# Patient Record
Sex: FEMALE | Race: WHITE | URBAN - METROPOLITAN AREA
[De-identification: names, ages, dates, MRNs, and addresses within clinical notes are randomized per-mention and may not be internally consistent; named-entity substitution may affect disease eponyms.]

---

## 2020-06-09 ENCOUNTER — VIRTUAL VISIT (OUTPATIENT)
Dept: FAMILY MEDICINE CLINIC | Age: 30
End: 2020-06-09
Payer: COMMERCIAL

## 2020-06-09 PROBLEM — G56.03 BILATERAL CARPAL TUNNEL SYNDROME: Status: ACTIVE | Noted: 2020-06-09

## 2020-06-09 PROBLEM — F32.5 MAJOR DEPRESSIVE DISORDER WITH SINGLE EPISODE, IN FULL REMISSION (HCC): Status: ACTIVE | Noted: 2020-06-09

## 2020-06-09 PROCEDURE — 99203 OFFICE O/P NEW LOW 30 MIN: CPT | Performed by: INTERNAL MEDICINE

## 2020-06-09 RX ORDER — DULOXETIN HYDROCHLORIDE 30 MG/1
30 CAPSULE, DELAYED RELEASE ORAL DAILY
COMMUNITY
Start: 2020-03-16 | End: 2020-06-09 | Stop reason: SDUPTHER

## 2020-06-09 RX ORDER — DULOXETIN HYDROCHLORIDE 30 MG/1
30 CAPSULE, DELAYED RELEASE ORAL DAILY
Qty: 30 CAPSULE | Refills: 5 | Status: SHIPPED | OUTPATIENT
Start: 2020-06-09 | End: 2020-09-22 | Stop reason: SDUPTHER

## 2020-06-09 RX ORDER — GABAPENTIN 300 MG/1
300 CAPSULE ORAL NIGHTLY
Qty: 30 CAPSULE | Refills: 3 | Status: SHIPPED | OUTPATIENT
Start: 2020-06-09 | End: 2020-07-02 | Stop reason: SDUPTHER

## 2020-06-09 RX ORDER — GABAPENTIN 100 MG/1
100 CAPSULE ORAL 3 TIMES DAILY
Qty: 90 CAPSULE | Refills: 3 | Status: SHIPPED | OUTPATIENT
Start: 2020-06-09 | End: 2020-09-22 | Stop reason: SDUPTHER

## 2020-06-09 RX ORDER — CYCLOBENZAPRINE HCL 10 MG
10 TABLET ORAL NIGHTLY PRN
COMMUNITY
Start: 2020-04-20 | End: 2020-06-09 | Stop reason: SDUPTHER

## 2020-06-09 RX ORDER — TRAZODONE HYDROCHLORIDE 100 MG/1
100 TABLET ORAL NIGHTLY
Qty: 30 TABLET | Refills: 3 | Status: SHIPPED | OUTPATIENT
Start: 2020-06-09 | End: 2020-09-22 | Stop reason: SDUPTHER

## 2020-06-09 RX ORDER — GABAPENTIN 300 MG/1
300 CAPSULE ORAL NIGHTLY
COMMUNITY
Start: 2020-03-31 | End: 2020-06-09 | Stop reason: SDUPTHER

## 2020-06-09 RX ORDER — CYCLOBENZAPRINE HCL 10 MG
10 TABLET ORAL NIGHTLY PRN
Qty: 30 TABLET | Refills: 3 | Status: SHIPPED | OUTPATIENT
Start: 2020-06-09 | End: 2020-11-17 | Stop reason: SDUPTHER

## 2020-06-09 RX ORDER — TRAZODONE HYDROCHLORIDE 100 MG/1
1 TABLET ORAL DAILY
COMMUNITY
Start: 2020-04-27 | End: 2020-06-09 | Stop reason: SDUPTHER

## 2020-06-09 RX ORDER — GABAPENTIN 100 MG/1
100 CAPSULE ORAL 3 TIMES DAILY
COMMUNITY
Start: 2020-03-18 | End: 2020-06-09 | Stop reason: SDUPTHER

## 2020-06-09 ASSESSMENT — ENCOUNTER SYMPTOMS
WHEEZING: 0
SORE THROAT: 0
DIARRHEA: 0
SHORTNESS OF BREATH: 0
VOMITING: 0
SINUS PRESSURE: 0
RHINORRHEA: 0
SINUS PAIN: 0
EYE REDNESS: 0
ABDOMINAL PAIN: 0
NAUSEA: 0
COUGH: 0
EYE DISCHARGE: 0

## 2020-06-09 NOTE — PROGRESS NOTES
advised to contact this office for worsening conditions or problems, and seek emergency medical treatment and/or call 911 if deemed necessary. Patient identification was verified at the start of the visit: Yes    Total time spent on this encounter: Not billed by time    Services were provided through a video synchronous discussion virtually to substitute for in-person clinic visit. Patient and provider were located at their individual homes. --Dea Payan MD on 6/9/2020 at 1:19 PM    An electronic signature was used to authenticate this note.

## 2020-07-02 ENCOUNTER — TELEPHONE (OUTPATIENT)
Dept: FAMILY MEDICINE CLINIC | Age: 30
End: 2020-07-02

## 2020-07-02 RX ORDER — GABAPENTIN 300 MG/1
300 CAPSULE ORAL NIGHTLY
Qty: 30 CAPSULE | Refills: 3 | Status: SHIPPED | OUTPATIENT
Start: 2020-07-02 | End: 2020-09-22 | Stop reason: SDUPTHER

## 2020-09-22 RX ORDER — DULOXETIN HYDROCHLORIDE 30 MG/1
30 CAPSULE, DELAYED RELEASE ORAL DAILY
Qty: 30 CAPSULE | Refills: 0 | Status: SHIPPED | OUTPATIENT
Start: 2020-09-22 | End: 2021-03-09 | Stop reason: SDUPTHER

## 2020-09-22 RX ORDER — GABAPENTIN 300 MG/1
300 CAPSULE ORAL NIGHTLY
Qty: 30 CAPSULE | Refills: 0 | Status: SHIPPED | OUTPATIENT
Start: 2020-09-22 | End: 2020-12-02

## 2020-09-22 RX ORDER — GABAPENTIN 100 MG/1
100 CAPSULE ORAL 3 TIMES DAILY
Qty: 90 CAPSULE | Refills: 0 | Status: SHIPPED | OUTPATIENT
Start: 2020-09-22 | End: 2020-12-02

## 2020-09-22 RX ORDER — TRAZODONE HYDROCHLORIDE 100 MG/1
100 TABLET ORAL NIGHTLY
Qty: 30 TABLET | Refills: 0 | Status: SHIPPED | OUTPATIENT
Start: 2020-09-22 | End: 2020-11-02

## 2020-09-22 NOTE — TELEPHONE ENCOUNTER
----- Message from Everardo  sent at 9/22/2020 12:22 PM EDT -----  Subject: Message to Provider    QUESTIONS  Information for Provider? Patient lost her job and had to move to [place]. Can she get a few months refill on her medication until she can get state   insurance in [place] to see a doctor? Please call patient to discuss. ---------------------------------------------------------------------------  --------------  Trung HAWTHORNE  What is the best way for the office to contact you? OK to leave message on   voicemail  Preferred Call Back Phone Number? [phone]  ---------------------------------------------------------------------------  --------------  SCRIPT ANSWERS  Relationship to Patient?  Self

## 2020-09-29 RX ORDER — TRAZODONE HYDROCHLORIDE 100 MG/1
TABLET ORAL
Qty: 30 TABLET | Refills: 0 | OUTPATIENT
Start: 2020-09-29

## 2020-11-02 RX ORDER — TRAZODONE HYDROCHLORIDE 100 MG/1
TABLET ORAL
Qty: 30 TABLET | Refills: 0 | Status: SHIPPED | OUTPATIENT
Start: 2020-11-02 | End: 2020-12-02

## 2020-11-17 RX ORDER — CYCLOBENZAPRINE HCL 10 MG
TABLET ORAL
Qty: 15 TABLET | Refills: 3 | OUTPATIENT
Start: 2020-11-17

## 2020-11-17 RX ORDER — CYCLOBENZAPRINE HCL 10 MG
10 TABLET ORAL NIGHTLY PRN
Qty: 30 TABLET | Refills: 0 | Status: SHIPPED | OUTPATIENT
Start: 2020-11-17 | End: 2020-12-23

## 2020-11-17 NOTE — TELEPHONE ENCOUNTER
Please call patient and let her know I sent her in a 30-day supply. Please document call and then close encounter.   thanks

## 2020-12-02 RX ORDER — GABAPENTIN 300 MG/1
CAPSULE ORAL
Qty: 90 CAPSULE | Refills: 0 | Status: SHIPPED | OUTPATIENT
Start: 2020-12-02 | End: 2021-03-09 | Stop reason: SDUPTHER

## 2020-12-02 RX ORDER — TRAZODONE HYDROCHLORIDE 100 MG/1
TABLET ORAL
Qty: 90 TABLET | Refills: 0 | Status: SHIPPED | OUTPATIENT
Start: 2020-12-02 | End: 2021-02-24

## 2020-12-02 RX ORDER — GABAPENTIN 100 MG/1
CAPSULE ORAL
Qty: 90 CAPSULE | Refills: 0 | Status: SHIPPED | OUTPATIENT
Start: 2020-12-02 | End: 2020-12-30

## 2020-12-30 RX ORDER — GABAPENTIN 100 MG/1
CAPSULE ORAL
Qty: 90 CAPSULE | Refills: 0 | Status: SHIPPED | OUTPATIENT
Start: 2020-12-30 | End: 2021-02-01

## 2020-12-30 NOTE — LETTER
[address], [address] [address]  Phone: [phone]  Fax: [phone]    Trung Lorenzo MD       December 31, 2020    [place] [id number]      Dear Maria Esther Almaraz:    Cyndi Hernandez are due for a follow up with Dr. Tenzin Grant in February 2021. Please call to schedule this at your soonest convenience for further fills on your medication. If you have any questions or concerns, please don't hesitate to call.     Sincerely,        Lila Dunn CMA

## 2021-02-01 RX ORDER — GABAPENTIN 100 MG/1
CAPSULE ORAL
Qty: 90 CAPSULE | Refills: 0 | Status: SHIPPED | OUTPATIENT
Start: 2021-02-01 | End: 2021-03-09 | Stop reason: SDUPTHER

## 2021-02-08 ENCOUNTER — TELEPHONE (OUTPATIENT)
Dept: FAMILY MEDICINE CLINIC | Age: 31
End: 2021-02-08

## 2021-02-08 NOTE — TELEPHONE ENCOUNTER
----- Message from [facility] [place] - INPATIENT sent at 2/8/2021 10:22 AM EST -----  Subject: Appointment Request    Reason for Call: Routine Existing Condition Follow Up    QUESTIONS  Type of Appointment? Established Patient  Reason for appointment request? No appointments available during search  Additional Information for Provider? Requesting a med check with PCP . Please follow up to advise   ---------------------------------------------------------------------------  --------------  CALL BACK INFO  What is the best way for the office to contact you? OK to leave message on   voicemail  Preferred Call Back Phone Number? [phone]  ---------------------------------------------------------------------------  --------------  SCRIPT ANSWERS  Relationship to Patient? Self  Appointment reason? Well Care/Follow Ups  Select a Well Care/Follow Ups appointment reason? Adult Existing Condition   Follow Up [Diabetes   CHF   COPD   Hypertension/Blood Pressure Check]  (Is the patient requesting to be seen urgently for their symptoms?)? No  Is this follow up request related to routine Diabetes Management? No  Are you having any new concerns about your existing condition? No  Have you been diagnosed with   tested for   or told that you are suspected of having COVID-19 (Coronavirus)? No  Have you had a fever or taken medication to treat a fever within the past   3 days? No  Have you had a cough   shortness of breath or flu-like symptoms within the past 3 days? No  Do you currently have flu-like symptoms including fever or chills   cough   shortness of breath   or difficulty breathing   or new loss of taste or smell? No  (Service Expert  click yes below to proceed with [employer] As Usual   Scheduling)?  Yes

## 2021-02-24 DIAGNOSIS — F51.01 PRIMARY INSOMNIA: ICD-10-CM

## 2021-02-24 RX ORDER — TRAZODONE HYDROCHLORIDE 100 MG/1
TABLET ORAL
Qty: 90 TABLET | Refills: 1 | Status: SHIPPED | OUTPATIENT
Start: 2021-02-24 | End: 2021-06-08

## 2021-03-09 ENCOUNTER — OFFICE VISIT (OUTPATIENT)
Dept: FAMILY MEDICINE CLINIC | Age: 31
End: 2021-03-09

## 2021-03-09 VITALS
WEIGHT: 116 LBS | RESPIRATION RATE: 16 BRPM | DIASTOLIC BLOOD PRESSURE: 88 MMHG | BODY MASS INDEX: 19.81 KG/M2 | HEART RATE: 71 BPM | SYSTOLIC BLOOD PRESSURE: 138 MMHG | OXYGEN SATURATION: 99 % | TEMPERATURE: 98.8 F | HEIGHT: 64 IN

## 2021-03-09 DIAGNOSIS — G56.03 BILATERAL CARPAL TUNNEL SYNDROME: ICD-10-CM

## 2021-03-09 DIAGNOSIS — F51.01 PRIMARY INSOMNIA: Primary | ICD-10-CM

## 2021-03-09 DIAGNOSIS — F32.5 MAJOR DEPRESSIVE DISORDER WITH SINGLE EPISODE, IN FULL REMISSION (HCC): ICD-10-CM

## 2021-03-09 DIAGNOSIS — M62.838 MUSCLE SPASM: ICD-10-CM

## 2021-03-09 PROCEDURE — 99214 OFFICE O/P EST MOD 30 MIN: CPT | Performed by: INTERNAL MEDICINE

## 2021-03-09 RX ORDER — DULOXETIN HYDROCHLORIDE 30 MG/1
30 CAPSULE, DELAYED RELEASE ORAL DAILY
Qty: 90 CAPSULE | Refills: 3 | Status: SHIPPED | OUTPATIENT
Start: 2021-03-09 | End: 2021-10-20 | Stop reason: SDUPTHER

## 2021-03-09 RX ORDER — GABAPENTIN 100 MG/1
100 CAPSULE ORAL 3 TIMES DAILY
Qty: 270 CAPSULE | Refills: 3 | Status: SHIPPED | OUTPATIENT
Start: 2021-03-09 | End: 2021-04-28

## 2021-03-09 RX ORDER — GABAPENTIN 300 MG/1
300 CAPSULE ORAL 2 TIMES DAILY
Qty: 180 CAPSULE | Refills: 3 | Status: SHIPPED | OUTPATIENT
Start: 2021-03-09 | End: 2021-04-28

## 2021-03-09 RX ORDER — CYCLOBENZAPRINE HCL 10 MG
10 TABLET ORAL NIGHTLY PRN
Qty: 90 TABLET | Refills: 0 | Status: SHIPPED | OUTPATIENT
Start: 2021-03-09 | End: 2021-06-08

## 2021-03-09 ASSESSMENT — ENCOUNTER SYMPTOMS: BACK PAIN: 1

## 2021-03-09 NOTE — PROGRESS NOTES
Brianda Vargas (:  1990) is a 33 y.o. female,Established patient, here for evaluation of the following chief complaint(s):  Medication Refill      ASSESSMENT/PLAN:  1. Primary insomnia  Doing very well on trazodone, continue. Advised her not to take in combination with the muscle relaxer however. She voices her agreement understanding  2. Muscle spasm  To use as needed. Heat application  -     cyclobenzaprine (FLEXERIL) 10 MG tablet; Take 1 tablet by mouth nightly as needed for Muscle spasms, Disp-90 tablet, R-0Normal  3. Bilateral carpal tunnel syndrome  She will see the hand specialist when she has insurance. Continue gabapentin for now. Tolerating well without any adverse effects. Refills provided today  4. Major depressive disorder with single episode, in full remission [facility])  She has done very well with Cymbalta. I found a coupon on good Rx so that this will be more affordable for her. She plans to continue it as it is much more affordable with the use of the coupon    Return in about 6 months (around 2021). SUBJECTIVE/OBJECTIVE:  HPI  Patient presents for routine follow-up. She has a history of carpal tunnel syndrome. She wants to have surgery done but she does not have insurance at present. She has been taking the gabapentin which is very effective for her. She typically takes 100 mg throughout the day to mitigate the sedation when she is trying to work. She will then take a higher dose at night 300 to 600 mg. This has been working well for her. No adverse effects. She does work as a  and gets muscle spasms in her back. She uses Flexeril as needed and request a refill today. No adverse effects of therapy. Her insomnia has been well controlled with trazodone. She is very happy with the medication and feels it is effective. Her mood is also been stable on Cymbalta, however, is expensive for her knowledge she does not have insurance.   She is wondering if there may be other options. She feels that it has been very effective for her mood however. Review of Systems   Musculoskeletal: Positive for back pain and myalgias. Negative for joint swelling, neck pain and neck stiffness. Neurological: Positive for numbness. Negative for weakness. Psychiatric/Behavioral: Negative for dysphoric mood, sleep disturbance and suicidal ideas. The patient is not nervous/anxious. Physical Exam  Constitutional:       Appearance: Normal appearance. HENT:      Head: Normocephalic and atraumatic. Cardiovascular:      Rate and Rhythm: Normal rate and regular rhythm. Heart sounds: No murmur. Pulmonary:      Effort: Pulmonary effort is normal. No respiratory distress. Breath sounds: Normal breath sounds. Neurological:      General: No focal deficit present. Psychiatric:         Mood and Affect: Mood normal.         Behavior: Behavior normal.         Thought Content: Thought content normal.         Judgment: Judgment normal.       Current Outpatient Medications   Medication Sig Dispense Refill    DULoxetine (CYMBALTA) 30 MG extended release capsule Take 1 capsule by mouth daily 90 capsule 3    gabapentin (NEURONTIN) 100 MG capsule Take 1 capsule by mouth 3 times daily for 90 days. 270 capsule 3    gabapentin (NEURONTIN) 300 MG capsule Take 1 capsule by mouth 2 times daily for 90 days. 180 capsule 3    cyclobenzaprine (FLEXERIL) 10 MG tablet Take 1 tablet by mouth nightly as needed for Muscle spasms 90 tablet 0    traZODone (DESYREL) 100 MG tablet TAKE 1 TABLET BY MOUTH EVERYDAY AT BEDTIME 90 tablet 1     No current facility-administered medications for this visit. An electronic signature was used to authenticate this note.     --Nichol Macias MD

## 2021-04-19 ENCOUNTER — TELEPHONE (OUTPATIENT)
Dept: FAMILY MEDICINE CLINIC | Age: 31
End: 2021-04-19

## 2021-04-19 NOTE — TELEPHONE ENCOUNTER
PT called in stating that she has carpal tunnel and that she works with [employer] and yesterday she felt like she had pins and needles in her hands. PT would like to know if Dr. Nichelle Rick can call in an antiinflammatory and possibly increase her gabapentin to help.      Please call Pt back: [phone]

## 2021-04-19 NOTE — TELEPHONE ENCOUNTER
She can take OTC ibuprofen. Would need appt to adjust medication. Also recommend she be wearing wrist braces.

## 2021-04-28 ENCOUNTER — OFFICE VISIT (OUTPATIENT)
Dept: FAMILY MEDICINE CLINIC | Age: 31
End: 2021-04-28

## 2021-04-28 VITALS
DIASTOLIC BLOOD PRESSURE: 88 MMHG | OXYGEN SATURATION: 100 % | HEART RATE: 83 BPM | BODY MASS INDEX: 20.94 KG/M2 | SYSTOLIC BLOOD PRESSURE: 144 MMHG | RESPIRATION RATE: 16 BRPM | WEIGHT: 122 LBS

## 2021-04-28 DIAGNOSIS — G56.03 BILATERAL CARPAL TUNNEL SYNDROME: Primary | ICD-10-CM

## 2021-04-28 PROCEDURE — 99214 OFFICE O/P EST MOD 30 MIN: CPT | Performed by: INTERNAL MEDICINE

## 2021-04-28 RX ORDER — GABAPENTIN 300 MG/1
CAPSULE ORAL
Qty: 180 CAPSULE | Refills: 3 | Status: SHIPPED | OUTPATIENT
Start: 2021-04-28 | End: 2021-04-28

## 2021-04-28 RX ORDER — GABAPENTIN 300 MG/1
CAPSULE ORAL
Qty: 180 CAPSULE | Refills: 3 | Status: SHIPPED | OUTPATIENT
Start: 2021-04-28 | End: 2021-10-20 | Stop reason: SDUPTHER

## 2021-04-28 NOTE — PROGRESS NOTES
Karina Posada (:  1990) is a 33 y.o. female,Established patient, here for evaluation of the following chief complaint(s):  Discuss Medications and Carpal Tunnel      ASSESSMENT/PLAN:  1. Bilateral carpal tunnel syndrome  Symptoms are progressing. She is awaiting surgery. We will increase her gabapentin. She can take 1 to 2 tablets 3 times daily as tolerated. Continue wrist splinting. See the surgeon as soon as her insurance is instated. She states that she will come back to see me as needed as once her insurance starts, she will be seen a primary care doctor in [place] most likely. She defers to call in the interim for any new issues. Return if symptoms worsen or fail to improve. SUBJECTIVE/OBJECTIVE:  HPI   Patient presents for f/u for CTS. Currently waiting for insurance to start at end of  to pursue surgery- has already seen a surgeon in [place] for this issue. Gabapentin helps. Does increase her dose when needed and is running out of pills. Basically adjusts her dose depending on her pain during the shift. There are days when she takes lower doses when the pain is better. EMG results per patient (with Dr. Sid Lemon) 1 year ago: Right severe CTS, left was mild on EMG. Feels it has worsened on the left as she is trying to commentate for the pain on the right. Wearing wrist splints as often/as much as she can. Difficult with her job sometimes but always at night. Review of Systems   Neurological: Positive for numbness. Negative for weakness. Physical Exam  Constitutional:       Appearance: Normal appearance. Neurological:      Mental Status: She is alert. bilateral wrist splints in place       An electronic signature was used to authenticate this note.     --Heena Hayes MD

## 2021-05-10 ENCOUNTER — TELEPHONE (OUTPATIENT)
Dept: FAMILY MEDICINE CLINIC | Age: 31
End: 2021-05-10

## 2021-05-10 DIAGNOSIS — G56.03 BILATERAL CARPAL TUNNEL SYNDROME: Primary | ICD-10-CM

## 2021-05-10 RX ORDER — NAPROXEN 250 MG/1
250 TABLET ORAL 2 TIMES DAILY PRN
Qty: 60 TABLET | Refills: 0 | Status: SHIPPED | OUTPATIENT
Start: 2021-05-10 | End: 2022-05-13 | Stop reason: ALTCHOICE

## 2021-05-10 NOTE — TELEPHONE ENCOUNTER
Patient calling regarding anti-inflammatory. She states her last office visit it was discuss,asking if something can be called into the pharmacy.   [address], [address] - P [phone]

## 2021-07-08 ENCOUNTER — TELEPHONE (OUTPATIENT)
Dept: FAMILY MEDICINE CLINIC | Age: 31
End: 2021-07-08

## 2021-07-08 NOTE — TELEPHONE ENCOUNTER
JANAEI-- calling to inform Dr Mandy Alva that she will not be transferring physicians to her company physician, that she is going to continue to be seen here.

## 2021-07-13 DIAGNOSIS — M62.838 MUSCLE SPASM: ICD-10-CM

## 2021-07-13 RX ORDER — CYCLOBENZAPRINE HCL 10 MG
10 TABLET ORAL NIGHTLY PRN
Qty: 90 TABLET | Refills: 0 | Status: SHIPPED | OUTPATIENT
Start: 2021-07-13 | End: 2021-10-11

## 2021-10-20 ENCOUNTER — OFFICE VISIT (OUTPATIENT)
Dept: FAMILY MEDICINE CLINIC | Age: 31
End: 2021-10-20
Payer: COMMERCIAL

## 2021-10-20 VITALS
SYSTOLIC BLOOD PRESSURE: 132 MMHG | BODY MASS INDEX: 19.97 KG/M2 | HEART RATE: 66 BPM | HEIGHT: 64 IN | DIASTOLIC BLOOD PRESSURE: 84 MMHG | WEIGHT: 117 LBS | OXYGEN SATURATION: 100 %

## 2021-10-20 DIAGNOSIS — G56.03 BILATERAL CARPAL TUNNEL SYNDROME: Primary | ICD-10-CM

## 2021-10-20 DIAGNOSIS — F32.5 MAJOR DEPRESSIVE DISORDER WITH SINGLE EPISODE, IN FULL REMISSION (HCC): ICD-10-CM

## 2021-10-20 DIAGNOSIS — F51.01 PRIMARY INSOMNIA: ICD-10-CM

## 2021-10-20 PROCEDURE — 99214 OFFICE O/P EST MOD 30 MIN: CPT | Performed by: INTERNAL MEDICINE

## 2021-10-20 RX ORDER — GABAPENTIN 300 MG/1
600 CAPSULE ORAL 3 TIMES DAILY
Qty: 180 CAPSULE | Refills: 3 | Status: SHIPPED | OUTPATIENT
Start: 2021-10-20 | End: 2022-08-30 | Stop reason: SDUPTHER

## 2021-10-20 RX ORDER — TRAZODONE HYDROCHLORIDE 100 MG/1
TABLET ORAL
Qty: 90 TABLET | Refills: 1 | Status: SHIPPED | OUTPATIENT
Start: 2021-10-20 | End: 2022-04-26 | Stop reason: SDUPTHER

## 2021-10-20 RX ORDER — DULOXETIN HYDROCHLORIDE 30 MG/1
30 CAPSULE, DELAYED RELEASE ORAL DAILY
Qty: 90 CAPSULE | Refills: 3 | Status: SHIPPED | OUTPATIENT
Start: 2021-10-20 | End: 2022-04-21 | Stop reason: SDUPTHER

## 2021-10-20 NOTE — PROGRESS NOTES
Boston Alvarado (:  1990) is a 33 y.o. female,Established patient, here for evaluation of the following chief complaint(s):  Carpal Tunnel      ASSESSMENT/PLAN:  1. Bilateral carpal tunnel syndrome  Continue gabapentin. Awaiting surgery   2. Primary insomnia  Working well, cpm  -     traZODone (DESYREL) 100 MG tablet; TAKE 1 TABLET BY MOUTH EVERYDAY AT BEDTIME, Disp-90 tablet, R-1Normal  3. Major depressive disorder with single episode, in full remission ([facility] [address]  Stable on present management, cpm.     Return in about 6 months (around 2022). SUBJECTIVE/OBJECTIVE:  HPI   Patient presents for f/u for CTS, insomnia and depression  New job. Needs to wait to have surgery. Got a new job. Working out time off. Brought prior EMG with her - severe CTS. Gabapentin helps    Trazodone working well for sleep. No a/e  Mood is stable on duloxetine     Review of Systems   Neurological: Positive for numbness. Negative for weakness. Psychiatric/Behavioral: Negative for dysphoric mood and sleep disturbance. The patient is not nervous/anxious. /84   Pulse 66   Ht 5' 4\" (1.626 m)   Wt 117 lb (53.1 kg)   SpO2 100%   BMI 20.08 kg/m²     Physical Exam  Constitutional:       Appearance: Normal appearance. Neurological:      Mental Status: She is alert. Psychiatric:         Mood and Affect: Mood normal.         Behavior: Behavior normal.         Thought Content: Thought content normal.         Judgment: Judgment normal.          An electronic signature was used to authenticate this note.     --Roby Zamora MD

## 2021-12-28 ENCOUNTER — TELEPHONE (OUTPATIENT)
Dept: FAMILY MEDICINE CLINIC | Age: 31
End: 2021-12-28

## 2021-12-28 NOTE — TELEPHONE ENCOUNTER
Called and spoke with patient, advised that Dr Jasiel Perez is not in the office anymore. Patient aware but hasn't decided if she wanted to stay within the practice. Patient will return call if she decides to stay.

## 2022-04-21 RX ORDER — DULOXETIN HYDROCHLORIDE 30 MG/1
30 CAPSULE, DELAYED RELEASE ORAL DAILY
Qty: 90 CAPSULE | Refills: 0 | Status: SHIPPED | OUTPATIENT
Start: 2022-04-21 | End: 2022-07-18

## 2022-04-21 NOTE — TELEPHONE ENCOUNTER
Patient is calling requesting refills for:    Medication:   Requested Prescriptions    Pending Prescriptions Disp Refills   DULoxetine (CYMBALTA) 30 MG extended release capsule 90 capsule 3    Sig: Take 1 capsule by mouth daily      Last Filled:      Patient Phone Number: [phone] (home)     Last appt: 10/20/2021   Next appt: 5/13/2022    Pharmacy:  [email or web address]  [id number] - [address] [phone] [facility] [phone]  [address] [place] [address]  Phone: [phone] Fax: [phone]

## 2022-04-25 DIAGNOSIS — F51.01 PRIMARY INSOMNIA: ICD-10-CM

## 2022-04-25 NOTE — TELEPHONE ENCOUNTER
Medication:   Requested Prescriptions     Pending Prescriptions Disp Refills    traZODone (DESYREL) 100 MG tablet 90 tablet 1     Sig: TAKE 1 TABLET BY MOUTH EVERYDAY AT BEDTIME       Last Filled:      Patient Phone Number: [phone] (home)     Last appt: 10/20/2021   Next appt: 5/13/2022

## 2022-04-26 RX ORDER — TRAZODONE HYDROCHLORIDE 100 MG/1
TABLET ORAL
Qty: 90 TABLET | Refills: 0 | Status: SHIPPED | OUTPATIENT
Start: 2022-04-26 | End: 2022-05-13

## 2022-05-12 DIAGNOSIS — F51.01 PRIMARY INSOMNIA: ICD-10-CM

## 2022-05-13 ENCOUNTER — OFFICE VISIT (OUTPATIENT)
Dept: FAMILY MEDICINE CLINIC | Age: 32
End: 2022-05-13

## 2022-05-13 VITALS
HEART RATE: 90 BPM | DIASTOLIC BLOOD PRESSURE: 80 MMHG | RESPIRATION RATE: 16 BRPM | SYSTOLIC BLOOD PRESSURE: 160 MMHG | OXYGEN SATURATION: 100 % | HEIGHT: 64 IN | BODY MASS INDEX: 20.83 KG/M2 | WEIGHT: 122 LBS

## 2022-05-13 DIAGNOSIS — G56.03 BILATERAL CARPAL TUNNEL SYNDROME: Primary | ICD-10-CM

## 2022-05-13 DIAGNOSIS — F33.41 RECURRENT MAJOR DEPRESSIVE DISORDER, IN PARTIAL REMISSION (HCC): ICD-10-CM

## 2022-05-13 DIAGNOSIS — I10 PRIMARY HYPERTENSION: ICD-10-CM

## 2022-05-13 DIAGNOSIS — Z86.39 HISTORY OF THYROID DISEASE: ICD-10-CM

## 2022-05-13 LAB
ANION GAP SERPL CALCULATED.3IONS-SCNC: 12 MMOL/L (ref 3–16)
BASOPHILS ABSOLUTE: 0.1 K/UL (ref 0–0.2)
BASOPHILS RELATIVE PERCENT: 1.1 %
BUN BLDV-MCNC: 9 MG/DL (ref 7–20)
CALCIUM SERPL-MCNC: 9.4 MG/DL (ref 8.3–10.6)
CHLORIDE BLD-SCNC: 104 MMOL/L (ref 99–110)
CO2: 24 MMOL/L (ref 21–32)
CREAT SERPL-MCNC: 0.7 MG/DL (ref 0.6–1.1)
EOSINOPHILS ABSOLUTE: 0.1 K/UL (ref 0–0.6)
EOSINOPHILS RELATIVE PERCENT: 1.5 %
GFR AFRICAN AMERICAN: >60
GFR NON-AFRICAN AMERICAN: >60
GLUCOSE BLD-MCNC: 92 MG/DL (ref 70–99)
HCT VFR BLD CALC: 41.9 % (ref 36–48)
HEMOGLOBIN: 14 G/DL (ref 12–16)
LYMPHOCYTES ABSOLUTE: 1.5 K/UL (ref 1–5.1)
LYMPHOCYTES RELATIVE PERCENT: 24.8 %
MCH RBC QN AUTO: 30.3 PG (ref 26–34)
MCHC RBC AUTO-ENTMCNC: 33.4 G/DL (ref 31–36)
MCV RBC AUTO: 90.9 FL (ref 80–100)
MONOCYTES ABSOLUTE: 0.4 K/UL (ref 0–1.3)
MONOCYTES RELATIVE PERCENT: 6 %
NEUTROPHILS ABSOLUTE: 4 K/UL (ref 1.7–7.7)
NEUTROPHILS RELATIVE PERCENT: 66.6 %
PDW BLD-RTO: 13.6 % (ref 12.4–15.4)
PLATELET # BLD: 208 K/UL (ref 135–450)
PMV BLD AUTO: 9.1 FL (ref 5–10.5)
POTASSIUM SERPL-SCNC: 4.6 MMOL/L (ref 3.5–5.1)
RBC # BLD: 4.61 M/UL (ref 4–5.2)
SODIUM BLD-SCNC: 140 MMOL/L (ref 136–145)
TSH REFLEX: 0.87 UIU/ML (ref 0.27–4.2)
WBC # BLD: 6 K/UL (ref 4–11)

## 2022-05-13 PROCEDURE — 36415 COLL VENOUS BLD VENIPUNCTURE: CPT | Performed by: FAMILY MEDICINE

## 2022-05-13 PROCEDURE — 99214 OFFICE O/P EST MOD 30 MIN: CPT | Performed by: FAMILY MEDICINE

## 2022-05-13 RX ORDER — MIRTAZAPINE 15 MG/1
15 TABLET, FILM COATED ORAL NIGHTLY
Qty: 30 TABLET | Refills: 1 | Status: SHIPPED | OUTPATIENT
Start: 2022-05-13 | End: 2022-06-06

## 2022-05-13 RX ORDER — CYCLOBENZAPRINE HCL 10 MG
10 TABLET ORAL 2 TIMES DAILY PRN
Qty: 180 TABLET | Refills: 0 | Status: SHIPPED | OUTPATIENT
Start: 2022-05-13 | End: 2022-05-13 | Stop reason: SDUPTHER

## 2022-05-13 RX ORDER — TRAZODONE HYDROCHLORIDE 100 MG/1
TABLET ORAL
Qty: 90 TABLET | Refills: 0 | OUTPATIENT
Start: 2022-05-13

## 2022-05-13 RX ORDER — CYCLOBENZAPRINE HCL 10 MG
10 TABLET ORAL DAILY
COMMUNITY
End: 2022-05-13 | Stop reason: SDUPTHER

## 2022-05-13 RX ORDER — CYCLOBENZAPRINE HCL 10 MG
10 TABLET ORAL 2 TIMES DAILY PRN
Qty: 180 TABLET | Refills: 0 | Status: SHIPPED | OUTPATIENT
Start: 2022-05-13 | End: 2022-08-09

## 2022-05-13 SDOH — ECONOMIC STABILITY: FOOD INSECURITY: WITHIN THE PAST 12 MONTHS, YOU WORRIED THAT YOUR FOOD WOULD RUN OUT BEFORE YOU GOT MONEY TO BUY MORE.: NEVER TRUE

## 2022-05-13 SDOH — ECONOMIC STABILITY: FOOD INSECURITY: WITHIN THE PAST 12 MONTHS, THE FOOD YOU BOUGHT JUST DIDN'T LAST AND YOU DIDN'T HAVE MONEY TO GET MORE.: NEVER TRUE

## 2022-05-13 ASSESSMENT — PATIENT HEALTH QUESTIONNAIRE - PHQ9
SUM OF ALL RESPONSES TO PHQ QUESTIONS 1-9: 2
1. LITTLE INTEREST OR PLEASURE IN DOING THINGS: 1
9. THOUGHTS THAT YOU WOULD BE BETTER OFF DEAD, OR OF HURTING YOURSELF: 0
5. POOR APPETITE OR OVEREATING: 0
8. MOVING OR SPEAKING SO SLOWLY THAT OTHER PEOPLE COULD HAVE NOTICED. OR THE OPPOSITE, BEING SO FIGETY OR RESTLESS THAT YOU HAVE BEEN MOVING AROUND A LOT MORE THAN USUAL: 0
SUM OF ALL RESPONSES TO PHQ QUESTIONS 1-9: 2
10. IF YOU CHECKED OFF ANY PROBLEMS, HOW DIFFICULT HAVE THESE PROBLEMS MADE IT FOR YOU TO DO YOUR WORK, TAKE CARE OF THINGS AT HOME, OR GET ALONG WITH OTHER PEOPLE: 0
4. FEELING TIRED OR HAVING LITTLE ENERGY: 0
2. FEELING DOWN, DEPRESSED OR HOPELESS: 1
7. TROUBLE CONCENTRATING ON THINGS, SUCH AS READING THE NEWSPAPER OR WATCHING TELEVISION: 0
3. TROUBLE FALLING OR STAYING ASLEEP: 0
SUM OF ALL RESPONSES TO PHQ QUESTIONS 1-9: 2
6. FEELING BAD ABOUT YOURSELF - OR THAT YOU ARE A FAILURE OR HAVE LET YOURSELF OR YOUR FAMILY DOWN: 0
SUM OF ALL RESPONSES TO PHQ QUESTIONS 1-9: 2
SUM OF ALL RESPONSES TO PHQ9 QUESTIONS 1 & 2: 2

## 2022-05-13 ASSESSMENT — SOCIAL DETERMINANTS OF HEALTH (SDOH): HOW HARD IS IT FOR YOU TO PAY FOR THE VERY BASICS LIKE FOOD, HOUSING, MEDICAL CARE, AND HEATING?: NOT HARD AT ALL

## 2022-05-13 NOTE — PROGRESS NOTES
A/P:    Diagnosis Orders   1. Bilateral carpal tunnel syndrome  Jareth Doan MD, Orthopedic Surgery, [facility]   2. Primary hypertension  Basic Metabolic Panel    TSH with Reflex    CBC with Auto Differential   3. Recurrent major depressive disorder, in partial remission ([facility] [address]     4. History of thyroid disease  TSH with Reflex     She agrees to get another opinion from orthopedic surgery on her carpal tunnel syndrome, she will continue gabapentin, duloxetine, she would like to stay at the current doses    She does qualify for the diagnosis of hypertension, she will get a BP meter and check her blood pressure daily for the next 7 to 10 days and then let me know those readings, will check above lab work, she reports having both hypo and hyperthyroidism as a teenager    For her decreased appetite, insomnia, known depression, will stop trazodone and add on mirtazapine 15 mg at night, she agrees to get medical attention of SI or HI should develop, she is not interested in any counseling, psychiatry referral    Follow-up in 6 weeks for Pap smear, med check, or sooner if needed    O:   BP (!) 160/80   Pulse 90   Resp 16   Ht 5' 4\" (1.626 m)   Wt 122 lb (55.3 kg)   LMP 04/28/2022   SpO2 100%   BMI 20.94 kg/m² Gen- NAD, pleasant  HEENT- Eyes without icterus or injection, throat and tms unremarkable  Neck- Supple, no lymphadenopathy appreciated  Lungs- CTAB  Heart- RRR  Abd- Soft, non tender  Ext- No edema  Psych- Appropriate    S: CC-establish care  HPI-patient presents establish care with new clinic provider. She reports chronic bilateral carpal tunnel syndrome, severe on right, mild on left for years. She has pain and weakness. She reports that she saw 2 orthopedic surgeons in the past who told her surgery was not an option. She reports her depression is reasonably controlled. However, she rates it a 5 out of 10 with 10 being the worst.  She has not been eating well for a few months.   She attributes this to stress. She denies SI, HI. She takes about a half a trazodone per night for insomnia. ROS  Denies fever, chills, night sweats  Denies headaches, sore throat, ear pain  Denies chest pain, dyspnea, palpitations  Denies nausea, vomiting, diarrhea  Denies rashes    Current Outpatient Medications   Medication Sig Dispense Refill    cyclobenzaprine (FLEXERIL) 10 MG tablet Take 1 tablet by mouth 2 times daily as needed for Muscle spasms 180 tablet 0    mirtazapine (REMERON) 15 MG tablet Take 1 tablet by mouth nightly 30 tablet 1    DULoxetine (CYMBALTA) 30 MG extended release capsule Take 1 capsule by mouth daily 90 capsule 0    gabapentin (NEURONTIN) 300 MG capsule Take 2 capsules by mouth 3 times daily for 30 days. Take 1-2 capsules three times daily as needed for pain and numbness 180 capsule 3     No current facility-administered medications for this visit. No Known Allergies     Past Medical History:   Diagnosis Date    Carpal tunnel syndrome     severe on right, mild left    Chronic pain     Depression     Insomnia     Thyroid disease     Both hypothyroidism and hyperthyroidism as a teenager, was on medications temporarily, reports level normalized        History reviewed. No pertinent surgical history.      Social History     Social History Narrative    Loves to work and stay busy, has girlfriend, has 5 dogs        Family History   Problem Relation Age of Onset    Diabetes Father     Breast Cancer Maternal Grandmother     Alzheimer's Disease Maternal Grandfather           Lucrecia Strange DO

## 2022-05-31 ENCOUNTER — TELEPHONE (OUTPATIENT)
Dept: FAMILY MEDICINE CLINIC | Age: 32
End: 2022-05-31

## 2022-05-31 NOTE — TELEPHONE ENCOUNTER
BP's are [place]. Rec dash diet, exercise. Please send again next week for Dr RAJAN to review. Labs from [facility]. Did she get results?     Sugar  kidney blood count and thyroid normal. Nice results

## 2022-05-31 NOTE — TELEPHONE ENCOUNTER
Pt was told to monitor her BP and call back with readings. 121/89  129/84  146/93  134/92  164/103  136/98  145/97  141/93  First thing in morning are the lower readings. Later in day are higher. Pt is reachable at [phone].

## 2022-06-06 RX ORDER — MIRTAZAPINE 15 MG/1
15 TABLET, FILM COATED ORAL NIGHTLY
Qty: 30 TABLET | Refills: 0 | Status: SHIPPED | OUTPATIENT
Start: 2022-06-06 | End: 2022-07-05

## 2022-06-06 NOTE — TELEPHONE ENCOUNTER
Medication:   Requested Prescriptions     Pending Prescriptions Disp Refills    mirtazapine (REMERON) 15 MG tablet [Pharmacy Med Name: MIRTAZAPINE 15 MG TABLET] 30 tablet 1     Sig: TAKE 1 TABLET BY MOUTH NIGHTLY       Last Filled:  5/13/22    Patient Phone Number: [phone] (home)     Last appt: 5/13/2022   Next appt: 11/14/2022

## 2022-07-05 RX ORDER — MIRTAZAPINE 15 MG/1
15 TABLET, FILM COATED ORAL NIGHTLY
Qty: 90 TABLET | Refills: 1 | Status: SHIPPED | OUTPATIENT
Start: 2022-07-05

## 2022-07-05 NOTE — TELEPHONE ENCOUNTER
Please see how patient is doing on the mirtazapine. Please see if she knows what her blood pressures have been running.

## 2022-07-05 NOTE — TELEPHONE ENCOUNTER
Spoke with patient about below message and pt states the medicine is working well her appetite has increased and sleeping well. Patient states she took her BP a couple days ago which was 127/90 and 134/88. Patient also states her medication is running out soon.

## 2022-07-05 NOTE — TELEPHONE ENCOUNTER
Pt called back stating she just wanted to give Dr. Virdiiana Gonzalez an update. Her BP today was 159/109 and 156/107. She took on two different machines to compare. Pt states she has an appt on 7/7 and can discuss this then.

## 2022-07-05 NOTE — TELEPHONE ENCOUNTER
Last Fill Date:  6/6/22  R:0  Last OV:5/13/22  Next OV:7/7/22   Plan Of Care:Return in about 6 months (around 11/13/2022) for pap smear, med, 30 mins.

## 2022-07-07 ENCOUNTER — OFFICE VISIT (OUTPATIENT)
Dept: FAMILY MEDICINE CLINIC | Age: 32
End: 2022-07-07

## 2022-07-07 VITALS
WEIGHT: 123 LBS | HEIGHT: 64 IN | OXYGEN SATURATION: 100 % | SYSTOLIC BLOOD PRESSURE: 150 MMHG | DIASTOLIC BLOOD PRESSURE: 90 MMHG | BODY MASS INDEX: 21 KG/M2 | HEART RATE: 61 BPM

## 2022-07-07 DIAGNOSIS — I10 WHITE COAT SYNDROME WITH HYPERTENSION: Primary | ICD-10-CM

## 2022-07-07 PROCEDURE — 99212 OFFICE O/P EST SF 10 MIN: CPT | Performed by: FAMILY MEDICINE

## 2022-07-07 RX ORDER — AMLODIPINE BESYLATE 2.5 MG/1
2.5 TABLET ORAL DAILY
Qty: 30 TABLET | Refills: 0 | Status: SHIPPED | OUTPATIENT
Start: 2022-07-07 | End: 2022-07-18

## 2022-07-07 NOTE — PROGRESS NOTES
A/P:    Diagnosis Orders   1. White coat syndrome with hypertension       Blood pressure was significantly improved on recheck. She agrees to start amlodipine 2.5 mg daily. She is to update me on her BP and pulse reading in 1 week. She agrees to be evaluated if she would become symptomatic. O: BP (!) 150/90   Pulse 61   Ht 5' 4\" (1.626 m)   Wt 123 lb (55.8 kg)   SpO2 100%   BMI 21.11 kg/m²    Gen- NAD, pleasant  HEENT- Eyes without icterus or injection  Neck- Supple, no lymphadenopathy appreciated  Lungs- CTAB  Heart- RRR  Abd- Soft, non tender  Ext- No edema  Psych- Appropriate    S: CC-elevated BPs  HPI-patient reports her BPs at home have been quite elevated. BP at home has been as high as 162/106 and 152/114. She denies headaches, chest pain, dyspnea, palpitations, weakness, change in vision. She does report today that her mom developed hypertension at a young age. She denies excessive caffeine use, cocaine use, stimulant use, any energy supplement use.     ROS- Per HPI    Patient's medications, allergies, and past medical hx were reviewed

## 2022-07-15 ENCOUNTER — TELEPHONE (OUTPATIENT)
Dept: FAMILY MEDICINE CLINIC | Age: 32
End: 2022-07-15

## 2022-07-15 NOTE — TELEPHONE ENCOUNTER
Calling with bp readings    9- 137/97 p-64   165/98 p-67  /91 p-61  /93 p-75   153/100 p-63   140/99 p-76  12- 172/112 p-82  /99 p-70   172/97 p-67  /114 p-90   160/111 p-62   156/96 p-68   148/100 73  15- 179/116 p-79   165/110 p-65

## 2022-07-18 RX ORDER — DULOXETIN HYDROCHLORIDE 30 MG/1
CAPSULE, DELAYED RELEASE ORAL
Qty: 90 CAPSULE | Refills: 3 | Status: SHIPPED | OUTPATIENT
Start: 2022-07-18

## 2022-07-18 RX ORDER — AMLODIPINE BESYLATE 2.5 MG/1
7.5 TABLET ORAL DAILY
Qty: 30 TABLET | Refills: 0
Start: 2022-07-18 | End: 2022-07-21 | Stop reason: SDUPTHER

## 2022-07-18 NOTE — TELEPHONE ENCOUNTER
Those numbers are quite high overall,  I would like her to increase her amlodipine to 7.5 mg daily, I would like her to update me on her BP readings in about 1 week, she may notice some swelling at this dose

## 2022-07-21 RX ORDER — AMLODIPINE BESYLATE 2.5 MG/1
7.5 TABLET ORAL DAILY
Qty: 90 TABLET | Refills: 2 | Status: SHIPPED | OUTPATIENT
Start: 2022-07-21 | End: 2022-09-15

## 2022-07-29 RX ORDER — AMLODIPINE BESYLATE 2.5 MG/1
TABLET ORAL
Qty: 30 TABLET | OUTPATIENT
Start: 2022-07-29

## 2022-08-01 ENCOUNTER — TELEPHONE (OUTPATIENT)
Dept: FAMILY MEDICINE CLINIC | Age: 32
End: 2022-08-01

## 2022-08-01 NOTE — TELEPHONE ENCOUNTER
Pt was told to call in with her BP readings:  18th 151-99 pulse 83,  19th 144/96 70,  139/86 73,  171/111 73,  138/91 80,  20th 181/120 72  21st 125/96 59,  140/93 72  22nd 129/83,   130/90 78  23rd 150/101 70  24th 156/101 73,  137/90 84  25th 129/94 70,  140/96 81,  143/97 81  26th 134/87 68,  119/75 66,  132/88 67,  122/81 71  27th 128/88 68,  133/83 67,  134/79 67  28th 117/82 81,  138/88 76  31st 137/87 71   1st 126/81 70

## 2022-08-02 NOTE — TELEPHONE ENCOUNTER
I am pleased with her BP improvement. I would like her to check her blood pressure twice per week for now and call if she has more than 1 reading above 103 systolic and or 90 diastolic.

## 2022-08-09 RX ORDER — CYCLOBENZAPRINE HCL 10 MG
10 TABLET ORAL 2 TIMES DAILY PRN
Qty: 180 TABLET | Refills: 0 | Status: SHIPPED | OUTPATIENT
Start: 2022-08-09

## 2022-08-15 RX ORDER — AMLODIPINE BESYLATE 2.5 MG/1
7.5 TABLET ORAL DAILY
Qty: 90 TABLET | Refills: 2 | OUTPATIENT
Start: 2022-08-15

## 2022-08-26 ENCOUNTER — OFFICE VISIT (OUTPATIENT)
Dept: ORTHOPEDIC SURGERY | Age: 32
End: 2022-08-26
Payer: COMMERCIAL

## 2022-08-26 ENCOUNTER — TELEPHONE (OUTPATIENT)
Dept: ORTHOPEDIC SURGERY | Age: 32
End: 2022-08-26

## 2022-08-26 VITALS — RESPIRATION RATE: 16 BRPM | HEIGHT: 64 IN | WEIGHT: 123 LBS | BODY MASS INDEX: 21 KG/M2

## 2022-08-26 DIAGNOSIS — R20.0 HAND NUMBNESS: Primary | ICD-10-CM

## 2022-08-26 PROCEDURE — 99204 OFFICE O/P NEW MOD 45 MIN: CPT | Performed by: ORTHOPAEDIC SURGERY

## 2022-08-26 SDOH — HEALTH STABILITY: PHYSICAL HEALTH: ON AVERAGE, HOW MANY DAYS PER WEEK DO YOU ENGAGE IN MODERATE TO STRENUOUS EXERCISE (LIKE A BRISK WALK)?: 6 DAYS

## 2022-08-26 SDOH — HEALTH STABILITY: PHYSICAL HEALTH: ON AVERAGE, HOW MANY MINUTES DO YOU ENGAGE IN EXERCISE AT THIS LEVEL?: 150+ MIN

## 2022-08-26 NOTE — Clinical Note
Dear  Eleazar Mesa, DO,  Thank you very much for your referral or Ms. Boston Alvarado to me for evaluation and treatment of her Hand & Wrist condition. I appreciate your confidence in me and thank you for allowing me the opportunity to care for your patients. If I can be of any further assistance to you on this or any other patient, please do not hesitate to contact me. Sincerely,  Katina Kayser.  Leas Felipe MD

## 2022-08-27 NOTE — PATIENT INSTRUCTIONS
Thank you for choosing [facility]) Physicians for your Hand and Upper Extremity needs. If we can be of any further assistance to you, please do not hesitate to contact us.     Office Phone Number:  [phone]  or  [phone]

## 2022-08-27 NOTE — PROGRESS NOTES
Ms. Kannan Campos is a 27 y.o. right handed woman  who is seen today in Hand Surgical Consultation at the request of Yady Reis DO. She presents today regarding Right greater than Left symptoms which have been present for approximately 3 years. A history of antecedent trauma or injury is Absent. She reports symptoms to include moderate numbness & tingling in the Median Innervated Digits and Ulnar Innervated Digits. Hand symptoms do Seldom awaken her from sleep. She reports mild pain located in the palmar both wrists. Symptoms are worsening over time. Previous treatment has included conservative measures and electrodiagnostic studies were ordered in 2019 but not acted upon. She does not claim relation of her symptoms to her required work activities. She has not undergone recent electrodiagnostic testing. I have today reviewed with Kannan Campos the clinically relevant, past medical history, medications, allergies,  family history, social history, and Review Of Systems & I have documented any details relevant to today's presenting complaints in my history above. Ms. Bertha Chan self-reported past medical history, medications, allergies,  family history, social history, and Review Of Systems have been scanned into the chart under the \"Media\" tab. Physical Exam:  Ms. Bertha Chan most recent vitals:  Vitals  Resp: 16  Height: 5' 4\" (162.6 cm)  Weight: 123 lb (55.8 kg)    She is well nourished, oriented to person, place & time. She demonstrates appropriate mood and affect as well as normal gait and station. Skin: Normal in appearance, Normal Color, and Free of Lesions Bilaterally   Digital range of motion is without significant limitation bilaterally  Wrist range of motion is equal bilaterally   There is no evidence of gross joint instability bilaterally.   Sensation is subjectively tingling in the Median Innervated Digits and Ulnar Innervated Digits on the Right, greater than Left and objectively present in the same distribution bilaterally. Vascular examination reveals normal and good capillary refill bilaterally  Swelling is absent in the distal volar forearm bilaterally  Muscular strength is clinically appropriate bilaterally. Examination for Carpal Tunnel Syndrome shows Carpal Tunnel Compression Test to be Mildly Positive on the right & Mildly Positive on the left. The patient displays moderate baseline symptoms to potentially confound the exam.  The thenar musculature is not atrophied & weakened. Examination for Cubital Tunnel Syndrome shows no tenderness to palpation at the Medial epicondyle. The Ulnar Nerve rests behind the medial epicondyle without subluxation upon elbow flexion. Elbow flexion-compression test is Equivocal, and there is an active Tinnel's Sign over the Cubital Tunnel. The Ulnar Nerve innervated intrinsic musculature is not atrophied & weakened. Review of Electrodiagnostic Testing:  Electrodiagnostic Studies performed by another Physician outside of my practice were Personally Reviewed & Interpreted by myself today. Test performed on: 8/6/2019    NERVE CONDUCTION STUDY:  RIGHT   Median Nerve: Sensory Latency: 3.15  Motor Latency: 4.60  Ulnar Nerve:  Conduction Velocity:  65    LEFT  Median Nerve: Sensory Latency: 2.55  Motor Latency: 4.14  Ulnar Nerve:  Conduction Velocity:  64    EMG:  RIGHT  Not Performed    LEFT  Not Performed    My Interpretation: This study is consistent with: Mild RIGHT Median Nerve Entrapment at the Carpal Tunnel and Mild LEFT Median Nerve Entrapment at the Carpal Tunnel      Impression:  Ms. Ashley Hopper has developed Carpal Tunnel Syndrome with possible ulnar nerve entrapment or other site of compression, which is currently exacerbated, and presents requesting further treatment.     Plan:  I have had a thorough discussion with Ms. Ashley Hopper regarding the treatment options available for her initially presenting bilateral carpal tunnel syndrome with possible cubital tunnel syndrome , which is causing her significant symptoms and difficulty. I have outlined for Ms. María Ridley the risk, benefits and consequences of the various treatment modalities, including a reasonable expectation for the long term success of each. We have discussed the likelihood that further, more aggressive treatment may be required for her current presenting condition. Based upon our current discussion and a reasonable understating of the options available to her, Ms. María Ridley has selected to proceed with a conservative plan of treatment consisting of: the use of protective splints, activity modification, and the judicious use of over-the-counter anti-inflammatory medications if allowed by her primary care physician. An appropriately sized Removable Carpal Tunnel Splint has been previously provided. Instructions were given regarding splint use and wear as well as suggestions for use of the other modalities were discussed. I have clearly explained to her that the above outlined treatment plan should not be expected to 'cure' her carpal tunnel syndrome, but we are rather treating the symptoms with which she presents. She has understood that in order to achieve more durable relief of her symptoms and to prevent future worsening or further damage, that definitive surgical treatment would be required. Ms. María Ridley  voiced an appropriate understanding of our discussion, the options available to her, and of the expectations of her selected  treatment. I will ask Ms. María Ridley. to undergo electrodiagnostic studies of the symptomatic both sides to evaluate for both median and ulnar nerve involvement . I will ask that she schedule a follow-up appointment with me to review the results of this study after it has been completed.     Ms. María Ridley has been given a full verbal list of instructions and precautions related to her present condition. I have asked her to followup with me in the office at the prescribed time. She is also specifically requested to call or return to the office sooner if her symptoms change or worsen prior to the next scheduled appointment.

## 2022-08-29 ENCOUNTER — TELEPHONE (OUTPATIENT)
Dept: ORTHOPEDIC SURGERY | Age: 32
End: 2022-08-29

## 2022-08-29 DIAGNOSIS — F51.01 PRIMARY INSOMNIA: ICD-10-CM

## 2022-08-29 RX ORDER — TRAZODONE HYDROCHLORIDE 100 MG/1
TABLET ORAL
Qty: 90 TABLET | Refills: 0 | OUTPATIENT
Start: 2022-08-29

## 2022-08-29 NOTE — TELEPHONE ENCOUNTER
Other      Patient calling in regard to EMG, would like order faxed to Dr. Castillo Many office at [address] [place] [address] still have not received order. NBY#[phone].      Please call once sent  [phone]

## 2022-08-30 RX ORDER — GABAPENTIN 300 MG/1
300 CAPSULE ORAL 3 TIMES DAILY PRN
Qty: 180 CAPSULE | Refills: 1 | Status: SHIPPED | OUTPATIENT
Start: 2022-08-30 | End: 2022-10-21 | Stop reason: SDUPTHER

## 2022-08-30 NOTE — TELEPHONE ENCOUNTER
Medication:   Requested Prescriptions     Pending Prescriptions Disp Refills    gabapentin (NEURONTIN) 300 MG capsule 180 capsule 0     Sig: Take 1 capsule by mouth 3 times daily as needed (pain and numbness).        Last Filled:      Patient Phone Number: [phone] (home)     Last appt: 7/7/2022   Next appt: 11/14/2022

## 2022-09-12 RX ORDER — CYCLOBENZAPRINE HCL 10 MG
10 TABLET ORAL 2 TIMES DAILY PRN
Qty: 180 TABLET | Refills: 0 | OUTPATIENT
Start: 2022-09-12

## 2022-09-12 NOTE — TELEPHONE ENCOUNTER
Medication:   Requested Prescriptions     Pending Prescriptions Disp Refills    cyclobenzaprine (FLEXERIL) 10 MG tablet [Pharmacy Med Name: CYCLOBENZAPRINE 10 MG TABLET] 180 tablet 0     Sig: TAKE 1 TABLET BY MOUTH 2 TIMES DAILY AS NEEDED FOR MUSCLE SPASMS. Last Filled:      Patient Phone Number: [phone] (home)     Last appt: 7/7/2022   Next appt: 11/14/2022    Last OARRS: No flowsheet data found.

## 2022-09-14 NOTE — TELEPHONE ENCOUNTER
Medication:   Requested Prescriptions     Pending Prescriptions Disp Refills    amLODIPine (NORVASC) 2.5 MG tablet [Pharmacy Med Name: AMLODIPINE BESYLATE 2.5 MG TAB] 90 tablet 2     Sig: TAKE 3 TABLETS BY MOUTH IN THE MORNING        Last Filled:  7/21/2022    Patient Phone Number: [phone] (home)     Last appt: 7/7/2022   Next appt: 11/14/2022    Last OARRS: No flowsheet data found.

## 2022-09-15 RX ORDER — AMLODIPINE BESYLATE 2.5 MG/1
7.5 TABLET ORAL DAILY
Qty: 90 TABLET | Refills: 2 | Status: SHIPPED | OUTPATIENT
Start: 2022-09-15 | End: 2022-10-28

## 2022-10-24 NOTE — TELEPHONE ENCOUNTER
Medication:   Requested Prescriptions     Pending Prescriptions Disp Refills    gabapentin (NEURONTIN) 300 MG capsule 180 capsule 0     Sig: Take 1 capsule by mouth 3 times daily as needed (pain and numbness) for up to 90 days.        Last Filled:      Patient Phone Number: [phone] (home)     Last appt: 7/7/2022   Next appt: 11/14/2022

## 2022-10-25 RX ORDER — GABAPENTIN 300 MG/1
300 CAPSULE ORAL 3 TIMES DAILY PRN
Qty: 180 CAPSULE | Refills: 0 | Status: SHIPPED | OUTPATIENT
Start: 2022-10-25 | End: 2023-01-23

## 2022-10-28 RX ORDER — AMLODIPINE BESYLATE 2.5 MG/1
7.5 TABLET ORAL DAILY
Qty: 270 TABLET | Refills: 3 | Status: SHIPPED | OUTPATIENT
Start: 2022-10-28

## 2022-10-28 NOTE — TELEPHONE ENCOUNTER
Medication:   Requested Prescriptions     Pending Prescriptions Disp Refills    amLODIPine (NORVASC) 2.5 MG tablet [Pharmacy Med Name: AMLODIPINE BESYLATE 2.5 MG TAB] 270 tablet 3     Sig: TAKE 3 TABLETS BY MOUTH IN THE MORNING       Last Filled:      Patient Phone Number: [phone] (home)     Last appt: 7/7/2022   Next appt: 11/14/2022

## 2022-11-14 ENCOUNTER — OFFICE VISIT (OUTPATIENT)
Dept: FAMILY MEDICINE CLINIC | Age: 32
End: 2022-11-14
Payer: COMMERCIAL

## 2022-11-14 VITALS
OXYGEN SATURATION: 99 % | HEIGHT: 64 IN | HEART RATE: 79 BPM | DIASTOLIC BLOOD PRESSURE: 70 MMHG | BODY MASS INDEX: 19.81 KG/M2 | TEMPERATURE: 98.9 F | SYSTOLIC BLOOD PRESSURE: 120 MMHG | WEIGHT: 116 LBS

## 2022-11-14 DIAGNOSIS — G89.29 OTHER CHRONIC PAIN: ICD-10-CM

## 2022-11-14 DIAGNOSIS — F43.21 GRIEF REACTION: Primary | ICD-10-CM

## 2022-11-14 DIAGNOSIS — I10 WHITE COAT SYNDROME WITH HYPERTENSION: ICD-10-CM

## 2022-11-14 DIAGNOSIS — F41.8 DEPRESSION WITH ANXIETY: ICD-10-CM

## 2022-11-14 PROCEDURE — 3074F SYST BP LT 130 MM HG: CPT | Performed by: FAMILY MEDICINE

## 2022-11-14 PROCEDURE — 3078F DIAST BP <80 MM HG: CPT | Performed by: FAMILY MEDICINE

## 2022-11-14 PROCEDURE — 99214 OFFICE O/P EST MOD 30 MIN: CPT | Performed by: FAMILY MEDICINE

## 2022-11-14 RX ORDER — GABAPENTIN 300 MG/1
300 CAPSULE ORAL 3 TIMES DAILY PRN
Qty: 180 CAPSULE | Refills: 0 | Status: CANCELLED | OUTPATIENT
Start: 2022-11-14 | End: 2023-02-12

## 2022-11-14 RX ORDER — MIRTAZAPINE 30 MG/1
30 TABLET, FILM COATED ORAL NIGHTLY
Qty: 30 TABLET | Refills: 1 | Status: SHIPPED | OUTPATIENT
Start: 2022-11-14

## 2022-11-14 RX ORDER — GABAPENTIN 600 MG/1
600 TABLET ORAL 3 TIMES DAILY
Qty: 270 TABLET | Refills: 1 | Status: SHIPPED | OUTPATIENT
Start: 2022-11-14 | End: 2023-05-13

## 2022-11-14 NOTE — PROGRESS NOTES
A/P:    Diagnosis Orders   1. Grief reaction        2. Depression with anxiety        3. Other chronic pain        4. White coat syndrome with hypertension          For her grief, depression/anxiety, open dialogue between patient and significant other encouraged. I let her know that people grieve in different ways. She would like to increase her mirtazapine to 30 mg daily. She will continue current duloxetine dose. She agrees to get medical attention if any SI or HI should develop. For her chronic pain, which is suboptimally controlled, increase gabapentin to 600 mg 3 times daily    Her hypertension is controlled, she will continue amlodipine    She declines flu vaccine today    She would like to hold off on Pap smear until next visit    Follow-up in 4 weeks    O: /70   Pulse 79   Temp 98.9 °F (37.2 °C)   Ht 5' 4\" (1.626 m)   Wt 116 lb (52.6 kg)   LMP 10/31/2022   SpO2 99%   BMI 19.91 kg/m²    Gen- NAD, pleasant  HEENT- Eyes without icterus or injection  Neck- Supple, no lymphadenopathy appreciated  Lungs- CTAB  Heart- RRR  Abd- Soft, non tender  Ext- No edema  Psych-dysthymic, tearful, appropriate, no SI/HI    S: CC-mood symptoms, stress, blood pressure follow-up  HPI-Amaris presents being under a tremendous amount of stress for the past 7 weeks especially. The grandparents of her significant other that they both had been taking care of long-term passed away. 1 grandparent  on  and the other grandparent  on . After that there was some arguing between family members on the  [employer] She also had significant injury to her dog that was very high price wise. She also will be off the week of work coming soon which she was not expecting. She notes decreased appetite, worsening of sleep, anxiety. She denies SI, HI. She reports her significant other has been isolating due to grief. For her chronic pain, she is awaiting EMG results.   She would like to try higher dose of gabapentin    ROS-she denies chest pain, dyspnea, palpitations  She denies fever, chills, night sweats    Patient's medications, allergies, and past medical hx were reviewed

## 2022-11-18 ENCOUNTER — TELEPHONE (OUTPATIENT)
Dept: ORTHOPEDIC SURGERY | Age: 32
End: 2022-11-18

## 2022-11-18 NOTE — TELEPHONE ENCOUNTER
Test Results     Type of Test: [facility]IE EMG  Date of Test: 11/10/22  Location of Test: [facility]  Patient Contact Number: [phone]    PATIENT IS REQ A RETURN CALL REGARDING EMG RESULTS THAT WERE DONE [email or web address]. PLEASE RETURN CALL TO ABOVE NUMBER.

## 2022-11-30 ENCOUNTER — TELEPHONE (OUTPATIENT)
Dept: ORTHOPEDIC SURGERY | Age: 32
End: 2022-11-30

## 2022-11-30 NOTE — TELEPHONE ENCOUNTER
Test Results     Type of Test: EMG  DATE: 08/26/22  Patient Contact Number: [phone]     Pt INQUIRING ABOUT HER EMG TEST RESULTS. ADVISED THAT AN APPT IS STANDARD FOLLOW UP. Pt STATES SHE JUST WANTS HER RESULTS. PLEASE CALL TO ADVISE FURTHER @ THE # ABOVE.

## 2022-11-30 NOTE — TELEPHONE ENCOUNTER
Spoke with the patient. I let her know that she has to come in for her EMG results.   Scheduled patient to come in

## 2022-12-07 RX ORDER — MIRTAZAPINE 30 MG/1
30 TABLET, FILM COATED ORAL NIGHTLY
Qty: 90 TABLET | Refills: 1 | Status: SHIPPED | OUTPATIENT
Start: 2022-12-07 | End: 2022-12-13 | Stop reason: SDUPTHER

## 2022-12-07 NOTE — TELEPHONE ENCOUNTER
Medication:   Requested Prescriptions     Pending Prescriptions Disp Refills    mirtazapine (REMERON) 30 MG tablet [Pharmacy Med Name: MIRTAZAPINE 30 MG TABLET] 30 tablet 1     Sig: TAKE 1 TABLET BY MOUTH NIGHTLY DOSE INCREASED        Last Filled:  11/14/2022    Patient Phone Number: [phone] (home)     Last appt: 11/14/2022   Next appt: 12/12/2022    Last OARRS: No flowsheet data found.

## 2022-12-12 NOTE — TELEPHONE ENCOUNTER
Pt called requesting a refill of her mirtazapine (REMERON) 30 MG tablet to be called into [facility] in [place]. [phone]. Pt states it was sent to [facility] ([place] but they are too expensive so she told them to put it back. Pt is reachable at [phone] if any questions.

## 2022-12-12 NOTE — TELEPHONE ENCOUNTER
Rx was sent to [facility] and patient told them to put it back because patient would like the Rx to be sent to [facility] in [place] [facility]. [phone].

## 2022-12-12 NOTE — TELEPHONE ENCOUNTER
Medication:   Requested Prescriptions     Pending Prescriptions Disp Refills    mirtazapine (REMERON) 30 MG tablet 90 tablet 1     Sig: Take 1 tablet by mouth nightly Dose increased        Last Filled:      Patient Phone Number: [phone] (home)     Last appt: 11/14/2022   Next appt: 12/19/2022    Last OARRS: No flowsheet data found.

## 2022-12-13 RX ORDER — MIRTAZAPINE 30 MG/1
30 TABLET, FILM COATED ORAL NIGHTLY
Qty: 90 TABLET | Refills: 1 | Status: SHIPPED | OUTPATIENT
Start: 2022-12-13

## 2023-01-03 ENCOUNTER — TELEPHONE (OUTPATIENT)
Dept: FAMILY MEDICINE CLINIC | Age: 33
End: 2023-01-03

## 2023-01-03 NOTE — TELEPHONE ENCOUNTER
Pt is calling because her BP is running high. She is calling to report her BP and pulse for the last week.      28- 147/109 76  /100 72  2- 146/102 79  3-0145/99 83

## 2023-01-05 ENCOUNTER — OFFICE VISIT (OUTPATIENT)
Dept: FAMILY MEDICINE CLINIC | Age: 33
End: 2023-01-05
Payer: COMMERCIAL

## 2023-01-05 VITALS
HEART RATE: 85 BPM | DIASTOLIC BLOOD PRESSURE: 80 MMHG | SYSTOLIC BLOOD PRESSURE: 140 MMHG | OXYGEN SATURATION: 99 % | HEIGHT: 64 IN | BODY MASS INDEX: 20.49 KG/M2 | WEIGHT: 120 LBS

## 2023-01-05 DIAGNOSIS — R09.89 LABILE HYPERTENSION: ICD-10-CM

## 2023-01-05 DIAGNOSIS — Z01.419 ENCOUNTER FOR GYNECOLOGICAL EXAMINATION: Primary | ICD-10-CM

## 2023-01-05 PROCEDURE — S0610 ANNUAL GYNECOLOGICAL EXAMINA: HCPCS | Performed by: FAMILY MEDICINE

## 2023-01-05 PROCEDURE — 90471 IMMUNIZATION ADMIN: CPT | Performed by: FAMILY MEDICINE

## 2023-01-05 PROCEDURE — 90715 TDAP VACCINE 7 YRS/> IM: CPT | Performed by: FAMILY MEDICINE

## 2023-01-05 RX ORDER — CYCLOBENZAPRINE HCL 10 MG
10 TABLET ORAL 2 TIMES DAILY PRN
Qty: 180 TABLET | Refills: 1 | Status: SHIPPED | OUTPATIENT
Start: 2023-01-05

## 2023-01-05 RX ORDER — AMLODIPINE BESYLATE 10 MG/1
10 TABLET ORAL DAILY
Qty: 90 TABLET | Refills: 0 | Status: SHIPPED | OUTPATIENT
Start: 2023-01-05

## 2023-01-05 ASSESSMENT — PATIENT HEALTH QUESTIONNAIRE - PHQ9
7. TROUBLE CONCENTRATING ON THINGS, SUCH AS READING THE NEWSPAPER OR WATCHING TELEVISION: 0
5. POOR APPETITE OR OVEREATING: 1
10. IF YOU CHECKED OFF ANY PROBLEMS, HOW DIFFICULT HAVE THESE PROBLEMS MADE IT FOR YOU TO DO YOUR WORK, TAKE CARE OF THINGS AT HOME, OR GET ALONG WITH OTHER PEOPLE: 1
2. FEELING DOWN, DEPRESSED OR HOPELESS: 1
SUM OF ALL RESPONSES TO PHQ QUESTIONS 1-9: 6
8. MOVING OR SPEAKING SO SLOWLY THAT OTHER PEOPLE COULD HAVE NOTICED. OR THE OPPOSITE, BEING SO FIGETY OR RESTLESS THAT YOU HAVE BEEN MOVING AROUND A LOT MORE THAN USUAL: 0
1. LITTLE INTEREST OR PLEASURE IN DOING THINGS: 2
3. TROUBLE FALLING OR STAYING ASLEEP: 0
6. FEELING BAD ABOUT YOURSELF - OR THAT YOU ARE A FAILURE OR HAVE LET YOURSELF OR YOUR FAMILY DOWN: 0
SUM OF ALL RESPONSES TO PHQ9 QUESTIONS 1 & 2: 3
SUM OF ALL RESPONSES TO PHQ QUESTIONS 1-9: 6
4. FEELING TIRED OR HAVING LITTLE ENERGY: 2
SUM OF ALL RESPONSES TO PHQ QUESTIONS 1-9: 6
9. THOUGHTS THAT YOU WOULD BE BETTER OFF DEAD, OR OF HURTING YOURSELF: 0
SUM OF ALL RESPONSES TO PHQ QUESTIONS 1-9: 6

## 2023-01-05 NOTE — PROGRESS NOTES
A/P:    Diagnosis Orders   1. Encounter for gynecological examination  PAP SMEAR      2. Labile hypertension  VL Renal Arterial Duplex Complete        Pap smear done today    For her hypertension which is a bit uncontrolled here in the office and uncontrolled at home, increase amlodipine from 7.5 mg to 10 mg, she is to update me on her BP readings in 7 to 10 days, I have also ordered a renal Doppler    Tdap vaccine today      O: BP (!) 140/80   Pulse 85   Ht 5' 4\" (1.626 m)   Wt 120 lb (54.4 kg)   LMP 12/28/2022   SpO2 99%   BMI 20.60 kg/m²    Gen- NAD, pleasant  HEENT- Eyes without icterus or injection  Neck- Supple, no lymphadenopathy appreciated  Lungs- CTAB  Heart- RRR  Abd- Soft, non tender  Ext- No edema  Psych- Appropriate, no si/hi  Breasts-no skin changes, masses, lymphadenopathy appreciated  -external genitalia unremarkable, vagina unremarkable, cervix remarkable, she does have what looks like a hemangioma at 12:00-panchito, she reports she has been told this before    S: CC-check up, gynecologic exam  HPI-Amaris presents for follow-up of hypertension, mood disorder. She reports she is doing better mood wise. She reports that Remeron does help her. She has gained a bit of weight on the medicine. Her blood pressure and pulses have been:      147/109 76  143/100 72  146/102 79  145/99 83    She denies chest pain, dyspnea, palpitations. She reports her last Pap smear was about 3 years ago and unremarkable. She denies abnormal Pap smears in the past.  She is in same-sex relationship. She denies abnormal discharge, pelvic pain, vaginal bleeding between periods. She reports that her menstrual periods are regular.     ROS- Per HPI    Patient's history was reviewed and updated

## 2023-02-01 ENCOUNTER — TELEPHONE (OUTPATIENT)
Dept: FAMILY MEDICINE CLINIC | Age: 33
End: 2023-02-01

## 2023-02-01 NOTE — TELEPHONE ENCOUNTER
Reviewed with Onita Notice, NP. Pt had medication adjustment at last visit. She should come in for an OV to see Dr. Jensen Doyle or [place] to discuss.     Called spoke to pt, appointment has been scheduled for 02/02/2023 @ 11:30 am.

## 2023-02-01 NOTE — TELEPHONE ENCOUNTER
Calling with bp readings:     01/10/23 @ 9 AM  148/95 pulse 59  01/12/23 @ 3 /110 pulse 104  01/17/23 @ 3 /110 pulse 86  01/19/23 @ 9 /100 pulse 89  01/22/23 @ 2 /93 pulse 80  01/25/23 @ 3:30 /100 pulse 83  01/28/23 @ 12 /100  pulse 73  01/30/23 @ 3:30 /94 pulse 59  01/31/23 @ 8:30 /99  pulse 84  02/01/23 @ 2:30 /109  pulse 89      Pt is reachable at [phone]

## 2023-02-02 ENCOUNTER — HOSPITAL ENCOUNTER (OUTPATIENT)
Dept: VASCULAR LAB | Age: 33
Discharge: HOME | End: 2023-02-02
Payer: COMMERCIAL

## 2023-02-02 ENCOUNTER — OFFICE VISIT (OUTPATIENT)
Dept: FAMILY MEDICINE CLINIC | Age: 33
End: 2023-02-02
Payer: COMMERCIAL

## 2023-02-02 VITALS
HEART RATE: 78 BPM | DIASTOLIC BLOOD PRESSURE: 85 MMHG | BODY MASS INDEX: 19.15 KG/M2 | TEMPERATURE: 98.2 F | OXYGEN SATURATION: 99 % | WEIGHT: 112.2 LBS | HEIGHT: 64 IN | SYSTOLIC BLOOD PRESSURE: 145 MMHG

## 2023-02-02 DIAGNOSIS — I10 HYPERTENSION, UNSPECIFIED TYPE: ICD-10-CM

## 2023-02-02 DIAGNOSIS — R09.89 LABILE HYPERTENSION: ICD-10-CM

## 2023-02-02 DIAGNOSIS — F43.21 GRIEF REACTION: Primary | ICD-10-CM

## 2023-02-02 PROCEDURE — 3074F SYST BP LT 130 MM HG: CPT | Performed by: FAMILY MEDICINE

## 2023-02-02 PROCEDURE — 3078F DIAST BP <80 MM HG: CPT | Performed by: FAMILY MEDICINE

## 2023-02-02 PROCEDURE — 99214 OFFICE O/P EST MOD 30 MIN: CPT | Performed by: FAMILY MEDICINE

## 2023-02-02 PROCEDURE — 93975 VASCULAR STUDY: CPT

## 2023-02-02 RX ORDER — DULOXETIN HYDROCHLORIDE 60 MG/1
60 CAPSULE, DELAYED RELEASE ORAL DAILY
Qty: 90 CAPSULE | Refills: 0 | Status: SHIPPED | OUTPATIENT
Start: 2023-02-02

## 2023-02-02 SDOH — ECONOMIC STABILITY: FOOD INSECURITY: WITHIN THE PAST 12 MONTHS, YOU WORRIED THAT YOUR FOOD WOULD RUN OUT BEFORE YOU GOT MONEY TO BUY MORE.: SOMETIMES TRUE

## 2023-02-02 SDOH — ECONOMIC STABILITY: INCOME INSECURITY: HOW HARD IS IT FOR YOU TO PAY FOR THE VERY BASICS LIKE FOOD, HOUSING, MEDICAL CARE, AND HEATING?: NOT VERY HARD

## 2023-02-02 SDOH — ECONOMIC STABILITY: TRANSPORTATION INSECURITY
IN THE PAST 12 MONTHS, HAS LACK OF TRANSPORTATION KEPT YOU FROM MEETINGS, WORK, OR FROM GETTING THINGS NEEDED FOR DAILY LIVING?: NO

## 2023-02-02 SDOH — ECONOMIC STABILITY: FOOD INSECURITY: WITHIN THE PAST 12 MONTHS, THE FOOD YOU BOUGHT JUST DIDN'T LAST AND YOU DIDN'T HAVE MONEY TO GET MORE.: NEVER TRUE

## 2023-02-02 SDOH — ECONOMIC STABILITY: HOUSING INSECURITY
IN THE LAST 12 MONTHS, WAS THERE A TIME WHEN YOU DID NOT HAVE A STEADY PLACE TO SLEEP OR SLEPT IN A SHELTER (INCLUDING NOW)?: NO

## 2023-02-02 ASSESSMENT — PATIENT HEALTH QUESTIONNAIRE - PHQ9
9. THOUGHTS THAT YOU WOULD BE BETTER OFF DEAD, OR OF HURTING YOURSELF: 1
4. FEELING TIRED OR HAVING LITTLE ENERGY: 2
6. FEELING BAD ABOUT YOURSELF - OR THAT YOU ARE A FAILURE OR HAVE LET YOURSELF OR YOUR FAMILY DOWN: 3
7. TROUBLE CONCENTRATING ON THINGS, SUCH AS READING THE NEWSPAPER OR WATCHING TELEVISION: 3
SUM OF ALL RESPONSES TO PHQ QUESTIONS 1-9: 20
2. FEELING DOWN, DEPRESSED OR HOPELESS: 3
8. MOVING OR SPEAKING SO SLOWLY THAT OTHER PEOPLE COULD HAVE NOTICED. OR THE OPPOSITE, BEING SO FIGETY OR RESTLESS THAT YOU HAVE BEEN MOVING AROUND A LOT MORE THAN USUAL: 0
1. LITTLE INTEREST OR PLEASURE IN DOING THINGS: 3
5. POOR APPETITE OR OVEREATING: 3
3. TROUBLE FALLING OR STAYING ASLEEP: 3
SUM OF ALL RESPONSES TO PHQ QUESTIONS 1-9: 21
SUM OF ALL RESPONSES TO PHQ9 QUESTIONS 1 & 2: 6
SUM OF ALL RESPONSES TO PHQ QUESTIONS 1-9: 21
10. IF YOU CHECKED OFF ANY PROBLEMS, HOW DIFFICULT HAVE THESE PROBLEMS MADE IT FOR YOU TO DO YOUR WORK, TAKE CARE OF THINGS AT HOME, OR GET ALONG WITH OTHER PEOPLE: 3
SUM OF ALL RESPONSES TO PHQ QUESTIONS 1-9: 21

## 2023-02-02 ASSESSMENT — COLUMBIA-SUICIDE SEVERITY RATING SCALE - C-SSRS
1. WITHIN THE PAST MONTH, HAVE YOU WISHED YOU WERE DEAD OR WISHED YOU COULD GO TO SLEEP AND NOT WAKE UP?: YES
5. HAVE YOU STARTED TO WORK OUT OR WORKED OUT THE DETAILS OF HOW TO KILL YOURSELF? DO YOU INTEND TO CARRY OUT THIS PLAN?: NO
4. HAVE YOU HAD THESE THOUGHTS AND HAD SOME INTENTION OF ACTING ON THEM?: NO
3. HAVE YOU BEEN THINKING ABOUT HOW YOU MIGHT KILL YOURSELF?: NO
2. HAVE YOU ACTUALLY HAD ANY THOUGHTS OF KILLING YOURSELF?: YES
6. HAVE YOU EVER DONE ANYTHING, STARTED TO DO ANYTHING, OR PREPARED TO DO ANYTHING TO END YOUR LIFE?: NO

## 2023-02-02 NOTE — PROGRESS NOTES
162/103          A/P:    Diagnosis Orders   1. Grief reaction        2. Hypertension, unspecified type          Amaris declines counseling, she is to let me know if she changes mind    She would like to increase her duloxetine to 60 mg/day, I feel this will help with her mood and chronic pain    She agrees to get medical attention if SI or HI should develop    Her blood pressure was significantly improved on second check, will continue current amlodipine dose for now, keep appointment for Doppler today    Follow-up in 2 weeks or sooner if needed    O: BP (!) 145/85   Pulse 78   Temp 98.2 °F (36.8 °C)   Ht 5' 4\" (1.626 m)   Wt 112 lb 3.2 oz (50.9 kg)   LMP  (LMP Unknown)   SpO2 99%   BMI 19.26 kg/m²    Gen- NAD, pleasant, tearful  HEENT- Eyes without icterus or injection  Neck- Supple, no lymphadenopathy appreciated  Lungs- CTAB  Heart- RRR  Abd- Soft, non tender  Ext- No edema  Psych- Appropriate, no si/hi, appropriate, intact insigght    S: CC-not doing well  HPI-Amaris reports in the past few weeks her girlfriend of 10 years broke up with her. She is also lost to beloved dogs. She is still living with girlfriend. She reports she is dealing with her grief by keeping her self busy. She feels overwhelmed. She says she would never hurt herself or anyone else. She does have some friends to reach out to. She we will be getting renal Doppler today for her hypertension.     ROS- She denies chest pain, dyspnea, palpitations    Patient's medications, allergies, and past medical hx were reviewed

## 2023-02-03 ENCOUNTER — OFFICE VISIT (OUTPATIENT)
Dept: ORTHOPEDIC SURGERY | Age: 33
End: 2023-02-03

## 2023-02-03 ENCOUNTER — TELEPHONE (OUTPATIENT)
Dept: ORTHOPEDIC SURGERY | Age: 33
End: 2023-02-03

## 2023-02-03 VITALS — WEIGHT: 112 LBS | HEIGHT: 64 IN | BODY MASS INDEX: 19.12 KG/M2

## 2023-02-03 DIAGNOSIS — G56.03 BILATERAL CARPAL TUNNEL SYNDROME: Primary | ICD-10-CM

## 2023-02-03 NOTE — LETTER
[facility] SURGERY  Surgery Scheduling Form:      DEMOGRAPHICS:                                                                                                              .    Patient Name:  Margareth Copeland  Patient :  1990   Patient SS#:  [id number]    Patient Phone:  [phone] (home)  Alt. Patient Phone:    Patient Address:  [address] [address]    PCP:  Yuliya Gu DO  Insurance:  Payor: UMR / Plan: UMR  / Product Type: *No Product type* /   Insurance ID Number:    DIAGNOSIS & PROCEDURE:                                                                                            .    Diagnosis:   bilateral Carpal Tunnel Syndrome (354.0)  Operation:  right Carpal Tunnel Release followed 3 weeks later by left Carpal Tunnel Release  [CPT: 92720]  Location:  [facility]  Surgeon:  Sheridan Johnson    SCHEDULING INFORMATION:                                                                                         .    Surgeon's Scheduling Instruction:  elective    RN Post-op Appt:  [x] Yes   [] No  Preferred Thursday:   [] Yes   [] No    Requested Date:    OR Time:   Patient Arrival Time:    OR Time Required:  15  Minutes  Anesthesia:  MAC/TIVA  Equipment:  None  Mini C-Arm:  No   Standard C-Arm:  No  Status:  outpatient  PAT Required:  Yes  Comments:                      Devan Perez. Nilesh Grimaldo MD  2/3/23 11:27 AM    BILLING INFORMATION:                                                                                                    .    Procedure:       CPT Code Modifier  right Carpal Tunnel Release followed 3 weeks later by left Carpal Tunnel Release    .                                        Pre Operative Physician Prophylaxis Orders - SCIP Protocols      Pre-Operative Antibiotic Order:    No Known Allergies       [x]  ----  No Antibiotic Ordered       []  ----  Give the following Antibiotic within 1 hour prior to start time:         Ancef 1 gram IV if patient is less than 200 pounds    or       Ancef 2 grams IV if patient is greater than 200 pounds    or      Vancomycin 1 gram IV (over 1 hour) if patient is allergic to           PENICILLINS or CEFALOSPORINS            Procedure: right Carpal Tunnel Release followed 3 weeks later by left Carpal Tunnel Release     Patient: Deangelo Mon  :    1990    Physician Signature:     Date: 2/3/23  Time: 11:27 AM

## 2023-02-03 NOTE — LETTER
CONSENT TO OPERATION  AND/OR OTHER PROCEDURE(S)          PATIENT : Dee Dee Weber   YOB: 1990      DATE : 2/3/23          1. I request and consent that Dr. Jacquei Matos. Barbara Ceja,  and/or his associates or assistants perform an operation and/or procedures on the above patient at  [place] [address], to treat the condition(s) which appear indicated by the diagnostic studies already performed. I have been told that in general terms the nature, purpose and reasonable expectations of the operation and/or procedure(s) are:       right Carpal Tunnel Release followed 3 weeks later by left Carpal Tunnel Release      2. It has been explained to me by the informing physician that during the course of the operation and/or procedure(s) unforeseen conditions may be revealed that necessitate an extension of the original operation and/or procedure(s) or different operation and/or procedures than those set forth in Paragraph 1. I therefore authorize and request that my physician and/or his associates or assistants perform such operations and/or procedures as are necessary and desirable in the exercise of professional judgment. The authority granted under this Paragraph 2 shall extend to all conditions that require treatment and are known to my physician at the time the operation is commenced. 3. I have been made aware by the informing physician of certain risks and consequences that are associated with the operation and/or procedure(s) described in Paragraph 1, the reasonable alternative methods or treatment, the possible consequences, the possibility that the operation and/or procedure(s) may be unsuccessful and the possibility of complications.   I understand the reasonably known risks to be:      - Bleeding  - Infection  - Poor Healing  - Possible Damage to Nerve, Vessel, Tendon/Muscle or Bone  - Need for further Treatment/Surgery  - Stiffness  - Pain  - Residual or Recurrent Symptoms  - Anesthetic and/or Medical Risks  - We have discussed the specific limitations and risks of hospital and/or office based treatment at this time due to the COVID-19 pandemic                I have been counseled about the risks of marissa Covid-19 in the joe-operative and post-operative periods related to this procedure. I have been made aware that marissa Covid-19 around the time of a surgical procedure may worsen my prognosis for recovering from the virus and lend to a higher morbidity and or mortality risk. With this knowledge, I have requested to proceed with the procedure as scheduled. 4. I have also been informed by the informing physician that there are other risks from both known and unknown causes that are attendant to the performance of any surgical procedure. I am aware that the practice of medicine and surgery is not an exact science, and that no guarantees have been made to me concerning the results of the operation and/or procedure(s). 5. I   CONSENT / REFUSE CONSENT  (strike the phrase that does not apply) to the taking of photographs before, during and/or after the operation or procedure for scientific/educational purposes. 6. I consent to the administration of anesthesia and to the use of such anesthetics as may be deemed advisable by the anesthesiologist who has been engaged by me or my physician. 7. I certify that I have read and understand the above consent to operation and/or other procedure(s); that the explanations therein referred to were made to me by the informing physician in advance of my signing this consent; that all blanks or statements requiring insertion or completion were filled in and inapplicable paragraphs, if any, were stricken before I signed; and that all questions asked by me about the operation and/or procedure(s) which I have consented to have been fully answered in a satisfactory manner. _______________________           2/3/23                              Witness     Signature Of Patient         Date        Kaya Best                                                 Informing Physician                                           Signature of Informing Physician                              If patient is unable to sign or is a minor, complete one of the following:    (A)  Patient is a minor   years of age. (B)  Patient is unable to sign because: The undersigned represents that he or she is duly authorized to execute this consent for and on behalf of the above named patient. Witness               o  Parent  o  Guardian   o  Spouse       o  Other (specify)                                           Patient Name: Kervni Sweeney  Patient YOB: 1990  Dr. Ivania Dick' Return To Work Policy  Regarding your ability to return to work after surgery or injury, Dr. Ivania Dick will not state that any patient is off of work or cannot work at all. He will place you on restrictions after your surgical procedure or injury. Depending on the details of your particular situation, Dr. Ivania Dick may state that you will have either light use or no use of your hand for a specific number of weeks. It is your obligation to communicate with your employer regarding your restrictions. It is your employer's decision as to whether they will accommodate your restrictions (i.e. allow you to come to work in your restricted capacity) or to not allow you to return to work under your restrictions. Dr. Ivania Dick does not participate in making this decision and cannot influence your employer regarding their decision. If you do not communicate your restrictions to your employer, or if you do not present to work as you are scheduled to, Dr. Ivania Dick will not provide an 'excuse' to explain your absence.   A doctors note, or official forms (BWC, FMLA, etc.) will be filled out, upon request, to indicate your date of surgery and your restrictions as stated above. Dr. Lena Magana' Narcotic Policy  Patients will only be prescribed narcotics after surgical procedures or significant injury. Not all procedures cause pain great enough to require Narcotics and thus, not all patients will receive prescriptions after surgical procedures or injuries. Narcotics are never prescribed for chronic conditions. Narcotics are never prescribed for use longer than one week at a time. Refills are only granted in unusual circumstances and only at Dr. Oskar Paniagua discretion. Patients who are receiving narcotic medication from another physician or who are under pain management contracts will not be given a prescription for narcotics for any reason. Surgery Arrival Time:  You have been advised of the START TIME for your surgery as well as the ARRIVAL TIME at which you need to arrive at the surgery facility. Please understand that there is a certain amount of preparation which takes place at the surgery facility prior to the start of your surgery. If you arrive later than your scheduled ARRIVAL TIME, it may be necessary to cancel your surgery for that day and reschedule your procedure due to lack of adequate time for pre-surgery preparations. Thank you for being on time for your arrival.    I have read the above policies and understand that by agreeing to proceed with treatment by Dr. Roxanna Felder and his team, that I am agreeing to abide by these policies.   Patient Name:  Jacqui Turner    Patient Signature:  _____________________________    Cheral Crigler Date:   2/3/23

## 2023-02-03 NOTE — PROGRESS NOTES
Ms. Ronelle Rinne returns today in follow-up of her previously treated  bilateral Carpal Tunnel Syndrome. She was last seen by me in August, 2022 at which time she was treated with conservative measures, activity modification, avoidance of bothersome tasks, and OTC medication. She experienced no relief of her initial symptoms. She  has noticed symptom worsening over the last several months. She returns today with worsened symptoms of bilateral numbness in the Median Innervated digits, tingling in the Median Innervated digits, numbness in the Ulnar Innervated digits, and tingling in the Ulnar  Innervated digits, requesting further treatment. I have today reviewed with Ronelle Rinne the clinically relevant, past medical history, medications, allergies,  family history, social history, and Review Of Systems & I have documented any details relevant to today's presenting complaints in my history above. Ms. Yuri Marcum self-reported past medical history, medications, allergies,  family history, social history, and Review Of Systems have been scanned into the chart under the \"Media\" tab. Physical Exam:  Vitals  Height: 5' 4\" (162.6 cm)  Weight: 112 lb (50.8 kg)  Ms. Ronelle Rinne appears well, she is in no apparent distress, she demonstrates appropriate mood & affect. Skin: Normal in appearance, Normal Color, and Free of Lesions Bilaterally   Digital range of motion is equal bilaterally   Wrist range of motion is equal bilaterally   There is no evidence of gross joint instability bilaterally. Sensation is subjectively tingling in the Whole Hand bilaterally and objectively present in the same distribution bilaterally. Vascular examination reveals good capillary refill and good color bilaterally. Swelling is absent in the Volar both wrists. Muscular strength is clinically appropriate bilaterally.   Examination for Carpal Tunnel Syndrome shows Carpal Tunnel Compression Test to be Mildly Positive on the right & Mildly Positive on the left. The patient displays moderate baseline symptoms to potentially confound the exam.  The thenar musculature is not atrophied & weakened. Examination for Cubital Tunnel Syndrome shows no tenderness to palpation at the Medial epicondyle. The Ulnar Nerve rests behind the medial epicondyle without subluxation upon elbow flexion. Elbow flexion-compression test is Negative, and there is an active Tinnel's Sign over the Cubital Tunnel. The Ulnar Nerve innervated intrinsic musculature is not atrophied & weakened. Review of Electrodiagnostic Testing:  Electrodiagnostic Studies performed by another Physician outside of my practice were Personally Reviewed & Interpreted by myself today. Test performed on: 11/10/22    NERVE CONDUCTION STUDY:  RIGHT   Median Nerve: Sensory Latency: 4.3  Motor Latency: 4.4  Ulnar Nerve:  Conduction Velocity:  61    LEFT  Median Nerve: Sensory Latency: 4.0  Motor Latency: 3.8  Ulnar Nerve:  Conduction Velocity:  64    EMG:  RIGHT  Normal    LEFT  Normal    My Interpretation: This study is consistent with: Mild RIGHT Median Nerve Entrapment at the Carpal Tunnel, Mild LEFT Median Nerve Entrapment at the Carpal Tunnel, No RIGHT Ulnar Nerve Entrapment at the Cubital Tunnel, and No LEFT  Ulnar Nerve Entrapment at the Cubital Tunnel      Impression:  Ms. Jacquelyn Troncoso is showing evidence of persistent Carpal Tunnel Syndrome after previous treatment. She requests additional treatment at this time. Plan:  I have had a thorough discussion with Ms. Jacquelyn Troncoso regarding the treatment options available for her worsened carpal tunnel syndrome, which is causing her significant  limitations.   I have outlined for Ms. Jacquelyn Troncoso the benefits and consequences of the various treatment modalities, including the fact that surgical treatment is the only modality which is reasonably expected to provide long lasting or permanent resolution of her symptoms. Based upon our current discussion and a reasonable understating of the options available to her, Ms. Sonya Hernandez has selected to proceed with surgical Carpal Tunnel Release. I have discussed the details of the surgical procedure, the pre, joe and postoperative concerns and the appropriate expectations after surgery with Ms. Sonya Hernandez today. She was given the opportunity to ask questions, voiced an understanding of the procedure, and she did wish to proceed with Staged Bilateral Carpal Tunnel Release. I had an extensive discussion with Ms. Sonya Hernandez (and any family members present with her today) regarding the natural history, etiology, and long term consequences of this problem. We have mutually selected a surgical treatment plan  and, in my opinion, surgical intervention is indicated at this time. I have discussed with her the potential complications, limitations, expectations, alternatives, and risks of Carpal Tunnel Release. She has had full opportunity to ask her questions. I have answered them all to her satisfaction. I feel that Ms. Sonya Hernandez (and any family members present with her today) do understand our discussion today and she has provided informed consent for Staged Bilateral Carpal Tunnel Release. I have explained to Ms. Sonya Hernandez that despite successful treatment (surgical or otherwise) of her current presenting condition, that due to her coexistent conditions (both diagnosed and undiagnosed), that she is likely to have some permanent residual symptoms related to these conditions that do not improve long term. I have also explained that maximal recovery of function & symptom improvement may take a full year or longer to realize. She voiced a clear understanding of this. I have also discussed with Ms. Sonya Hernandez the other treatment options available to her for this condition.   We have today selected to proceed with Surgical treatment. She has voiced and  understanding that there are other less aggressive treatment options which are available in this situation, albeit possibly less efficacious or durable, and she is comfortable with the plan that she has chosen. Ms. Lee Conley has been given a full verbal list of instructions and precautions related to her present condition. I have asked her to followup with me in the office at the prescribed time. She is also specifically requested to call or return to the office sooner if her symptoms change or worsen prior to the next scheduled appointment.

## 2023-02-04 NOTE — PATIENT INSTRUCTIONS
Pre-Operative Instructions    1. The night before your surgery, unless otherwise instructed, do not eat any food, drink any liquids, chew gum or mints after midnight. Abstain from alcohol for 24 hours prior to surgery. 2. You will be contacted by the Hospital the working day prior to your procedure to confirm your arrival time. 3. Patients under 25years of age must have a parent or legal guardian present to sign their consent and discharge paperwork. 4. On the day of surgery,  you will be seen pre-operatively by an anesthesiologist.     5. If you are having hand surgery, it is recommended that nail polish and acrylic nails be removed prior to surgery if possible. 6. Please bring cases for glasses, contact lenses, hearing aids or dentures. They will likely be removed prior to surgery. 7. Wear casual, loose-fitting and comfortable clothing. Consider that you may have a large dressing to fit under your clothing after surgery. 9. Please do not bring valuables such as jewelry or large sums of cash to the hospital. Remove all body piercings before coming to the hospital. Veda Lenz may not  wear any rings on the hand if you are having surgery on that hand, wrist or elbow. 10. Do not smoke or chew tobacco before your surgery. [address] [place] [address] and surgery facilities are smoke-free environments. Smoking is not permitted anywhere on campus. 11. Be sure to follow any additional instructions from your physician. If the above conditions are not met, your surgery may be cancelled and rescheduled for another day. Should you develop any change in your health such as fever, cough, sore throat, cold, flu, or infection, or if you have any questions regarding your Pre-admission or surgery, please contact [address] - Surgery Scheduling at [phone], Monday through Friday, 9 a.m. to 5 p.m.

## 2023-02-06 ENCOUNTER — TELEPHONE (OUTPATIENT)
Dept: FAMILY MEDICINE CLINIC | Age: 33
End: 2023-02-06

## 2023-02-06 DIAGNOSIS — I70.1 RENAL ARTERY STENOSIS, NATIVE (HCC): Primary | ICD-10-CM

## 2023-02-06 DIAGNOSIS — I70.1 RENAL ARTERY STENOSIS (HCC): ICD-10-CM

## 2023-02-09 PROBLEM — I70.1 RENAL ARTERY STENOSIS (HCC): Status: ACTIVE | Noted: 2023-02-09

## 2023-02-09 NOTE — TELEPHONE ENCOUNTER
I spoke with Amaris about ultrasound showing advanced right renal artery stenosis. The possibility of a condition called fibromuscular dysplasia needs to be considered as well. She agrees to referral to vascular surgery.   High-priority referral placed

## 2023-02-17 RX ORDER — GABAPENTIN 600 MG/1
600 TABLET ORAL 3 TIMES DAILY
Qty: 270 TABLET | Refills: 1 | OUTPATIENT
Start: 2023-02-17 | End: 2023-08-16

## 2023-02-20 RX ORDER — GABAPENTIN 600 MG/1
600 TABLET ORAL 3 TIMES DAILY
Qty: 270 TABLET | Refills: 1 | Status: SHIPPED | OUTPATIENT
Start: 2023-02-20 | End: 2023-02-24 | Stop reason: SDUPTHER

## 2023-02-24 RX ORDER — GABAPENTIN 600 MG/1
600 TABLET ORAL 3 TIMES DAILY
Qty: 270 TABLET | Refills: 1 | Status: SHIPPED | OUTPATIENT
Start: 2023-02-24 | End: 2023-08-23

## 2023-02-24 NOTE — TELEPHONE ENCOUNTER
Medication:   Requested Prescriptions     Pending Prescriptions Disp Refills    gabapentin (NEURONTIN) 600 MG tablet 270 tablet 1     Sig: Take 1 tablet by mouth 3 times daily for 180 days. Last Filled:  02/20/2023    Patient Phone Number: [phone] (home)     Last appt: 2/2/2023   Next appt: Visit date not found    Last OARRS: No flowsheet data found.

## 2023-03-08 ENCOUNTER — OFFICE VISIT (OUTPATIENT)
Dept: VASCULAR SURGERY | Age: 33
End: 2023-03-08
Payer: COMMERCIAL

## 2023-03-08 VITALS
HEIGHT: 64 IN | HEART RATE: 100 BPM | BODY MASS INDEX: 19.12 KG/M2 | DIASTOLIC BLOOD PRESSURE: 108 MMHG | WEIGHT: 112 LBS | SYSTOLIC BLOOD PRESSURE: 140 MMHG

## 2023-03-08 DIAGNOSIS — I15.0 RENOVASCULAR HYPERTENSION: ICD-10-CM

## 2023-03-08 DIAGNOSIS — I70.1 RENAL ARTERY STENOSIS (HCC): Primary | ICD-10-CM

## 2023-03-08 PROCEDURE — 99204 OFFICE O/P NEW MOD 45 MIN: CPT | Performed by: SURGERY

## 2023-03-08 PROCEDURE — 3077F SYST BP >= 140 MM HG: CPT | Performed by: SURGERY

## 2023-03-08 PROCEDURE — 3080F DIAST BP >= 90 MM HG: CPT | Performed by: SURGERY

## 2023-03-08 ASSESSMENT — ENCOUNTER SYMPTOMS
EYES NEGATIVE: 1
GASTROINTESTINAL NEGATIVE: 1
RESPIRATORY NEGATIVE: 1

## 2023-03-08 NOTE — Clinical Note
Dr. Balwinder Maya,  I saw Elaine Palma in the office today for evaluation of the recent renal artery duplex scan that suggested a severe right renal artery stenosis which may be causative of her hypertension. She will undergo a CTA first to confirm this and then consider intervention based upon the findings. I will keep you appraised of our findings and plans. If you have any questions please feel free to contact me. Thanks for asked me to see her and please let me know if I can help you with any other patients in the future.   Tamiko Vargas

## 2023-03-08 NOTE — PROGRESS NOTES
Subjective:      Patient ID: Lisa Brandt is a 26 y.o. female. HPI Referral from GENO Hua DO for evaluation of 1 year of progressively worsening hypertension not responding to oral medications (1 single agent). Patient remarks that her blood pressure even with self-monitoring is over 974 systolic and diastolic is always over 442. Prior to this hypertensive problem she had no issues medically at all. Recent work-up included a duplex scan of her kidneys and renal arteries. This suggested the presence of a severe right renal artery stenosis in the mid renal artery possibly related to fibromuscular dysplasia. No history of atherosclerosis. No history of smoking. Past Medical History:   Diagnosis Date    Carpal tunnel syndrome     severe on right, mild left    Chronic pain     Depression     Hypertension     Insomnia     Thyroid disease     Both hypothyroidism and hyperthyroidism as a teenager, was on medications temporarily, reports level normalized     History reviewed. No pertinent surgical history. No Known Allergies  Current Outpatient Medications   Medication Sig Dispense Refill    gabapentin (NEURONTIN) 600 MG tablet Take 1 tablet by mouth 3 times daily for 180 days. 270 tablet 1    DULoxetine (CYMBALTA) 60 MG extended release capsule Take 1 capsule by mouth daily Dose increased 90 capsule 0    cyclobenzaprine (FLEXERIL) 10 MG tablet Take 1 tablet by mouth 2 times daily as needed for Muscle spasms 180 tablet 1    amLODIPine (NORVASC) 10 MG tablet Take 1 tablet by mouth daily 90 tablet 0    mirtazapine (REMERON) 30 MG tablet Take 1 tablet by mouth nightly Dose increased 90 tablet 1    DULoxetine (CYMBALTA) 30 MG extended release capsule TAKE ONE CAPSULE BY MOUTH DAILY 90 capsule 3     No current facility-administered medications for this visit.      Social History     Socioeconomic History    Marital status: Single     Spouse name: Not on file    Number of children: Not on file    Years of education: Not on file    Highest education level: Not on file   Occupational History    Not on file   Tobacco Use    Smoking status: Never    Smokeless tobacco: Never   Vaping Use    Vaping Use: Never used   Substance and Sexual Activity    Alcohol use: Never    Drug use: Never    Sexual activity: Yes     Partners: Female   Other Topics Concern    Not on file   Social History Narrative    Loves to work and stay busy, has girlfriend, has 9 dogs     Social Determinants of Health     Financial Resource Strain: Low Risk     Difficulty of Paying Living Expenses: Not hard at all   Food Insecurity: No Food Insecurity    Worried About [employer] in the Last Year: Never true    [address] [employer] in the Last Year: Never true   Transportation Needs: Not on file   Physical Activity: Sufficiently Active    Days of Exercise per Week: 6 days    Minutes of Exercise per Session: 150+ min   Stress: Not on file   Social Connections: Not on file   Intimate Partner Violence: Not At Risk    Fear of Current or Ex-Partner: No    Emotionally Abused: No    Physically Abused: No    Sexually Abused: No   Housing Stability: Not on file     Family History   Problem Relation Age of Onset    Hypertension Mother     Other Mother         uterine fibroids    Diabetes Father     Breast Cancer Maternal Grandmother     Uterine Cancer Maternal Grandmother     Alzheimer's Disease Maternal Grandfather          Review of Systems   Constitutional:  Positive for fatigue. HENT: Negative. Eyes: Negative. Respiratory: Negative. Cardiovascular:  Positive for palpitations. Gastrointestinal: Negative. Endocrine: Negative. Genitourinary: Negative. Musculoskeletal:  Positive for joint swelling. Skin: Negative. Allergic/Immunologic: Positive for environmental allergies. Neurological:  Positive for weakness and numbness (toes). Hematological:  Bruises/bleeds easily. Psychiatric/Behavioral: Negative.      15 point review of systems confirmed personally by this MD.    Objective:   Physical Exam  Vitals reviewed. Constitutional:       Appearance: Normal appearance. She is normal weight. Comments: Quite thin   HENT:      Head: Normocephalic and atraumatic. Right Ear: External ear normal.      Left Ear: External ear normal.      Nose: Nose normal.      Mouth/Throat:      Mouth: Mucous membranes are moist.      Pharynx: Oropharynx is clear. Eyes:      Conjunctiva/sclera: Conjunctivae normal.      Pupils: Pupils are equal, round, and reactive to light. Cardiovascular:      Rate and Rhythm: Normal rate and regular rhythm. Heart sounds: Normal heart sounds. Pulmonary:      Effort: Pulmonary effort is normal.      Breath sounds: Normal breath sounds. Abdominal:      General: Abdomen is flat. Bowel sounds are normal.      Palpations: Abdomen is soft. There is no mass. Hernia: No hernia is present. Comments: No bruits   Musculoskeletal:      Cervical back: Normal range of motion and neck supple. Right lower leg: No edema. Left lower leg: No edema. Skin:     General: Skin is warm and dry. Capillary Refill: Capillary refill takes less than 2 seconds. Findings: No lesion or rash. Neurological:      General: No focal deficit present. Mental Status: She is alert and oriented to person, place, and time. Cranial Nerves: No cranial nerve deficit. Sensory: No sensory deficit. Motor: No weakness. Coordination: Coordination normal.      Gait: Gait normal.   Psychiatric:         Mood and Affect: Mood normal.         Behavior: Behavior normal.         Thought Content:  Thought content normal.         Judgment: Judgment normal.       Pulses:   R bruit  L bruit   2   carotid 2    2   brachial 2    2   radial 2    2   femoral 2    2   popliteal 2    2   posterior tibial 2    2   dorsalis pedis 2       bypass graft         Renal artery duplex 2/2/2023 - reviewed personally - agree  Impressions   Right Impression   There is a high-end 60-99% stenosis of the mid right renal artery. Cannot rule   out FMD. Clinical correlation recommended. The right RAR is 7.8. The right renal vein is patent. The parenchymal resistive index is within low normal limits. The right kidney measures 9.8 cm x 5.2 cm in the longitudinal plane. Left Impression   The left RAR 0.6. There is no evidence to suggest renal artery stenosis. The left renal vein is patent. The parenchymal resistive index is within normal limits. The left kidney measures 10.8 cm x 5.2 cm in the longitudinal plane. Unilateral ImpressionNo aneurysmal changes noted in the abdominal aorta. Normal flow pattern noted throughout the abdominal aorta. Conclusions        Summary        There is a high-end 60-99% stenosis of the mid right renal artery. Cannot    rule out FMD. Clinical correlation recommended. The right RAR is 7.8. The right renal vein is patent. The parenchymal resistive index is within low normal limits. The right kidney measures 9.8 cm x 5.2 cm in the longitudinal plane. The left RAR 0.6. There is no evidence to suggest renal artery stenosis. The left renal vein is patent. The parenchymal resistive index is within normal limits. The left kidney measures 10.8 cm x 5.2 cm in the longitudinal plane. No aneurysmal changes noted in the abdominal aorta. Normal flow pattern noted throughout the abdominal aorta. Signature        ------------------------------------------------------------------    Electronically signed by Garrett Bennett MD (Interpreting    physician) on 02/03/2023 at 04:11 PM    ------------------------------------------------------------------       Patient Status:Routine. Study [facility]  - Vascular Lab. Technical Quality:Adequate visualization.        Risk Factors         - The patient's risk factor(s) include: arterial hypertension. Velocities are measured in cm/s ; Diameters are measured in cm       Abdominal Aortic Flow   +----------+-----+----+-------+----------+   ! Location  ! PSV  ! EDV ! AP Diam!Trans Diam!   +----------+-----+----+-------+----------+   ! Prox Aorta!117. 1! 29  !2.4    !2.2       ! +----------+-----+----+-------+----------+   ! Mid Aorta !84.4 !18.6!2      !1.7       ! +----------+-----+----+-------+----------+   ! Dist Aorta!51.9 !10.1!1.5    !1.5       ! +----------+-----+----+-------+----------+       Renal Duplex Measurements   +--------------++-----+----+----+---++-----+----+----+---+   ! Renal Artery A!! Right! ! Left!   !!     !    !    !   !   +--------------++-----+----+----+---++-----+----+----+---+   ! Location      ! !PSV  ! EDV ! RI  !RAR!!PSV  ! EDV ! RI  !RAR!   +--------------++-----+----+----+---++-----+----+----+---+   ! Ostial Renal  !!61.7 !23.6!0.62!   !!71.1 !14.6!0.79!   ! +--------------++-----+----+----+---++-----+----+----+---+   ! Prox Renal    !!80.1 !37.1!0.54!   !!100. 2! 45  !0.55!   ! +--------------++-----+----+----+---++-----+----+----+---+   ! Mid Renal     !!920  ! 559 !0.39!   !!202.5!96.5!0.52!   ! +--------------++-----+----+----+---++-----+----+----+---+   ! Dist Renal    !!87.6 !41.5!0.53!   !!238  !79.6!0.67!   ! +--------------++-----+----+----+---++-----+----+----+---+       Right Miscellaneous Measurements     - The average kidney length is 9.85 cm. - The right renal vein is patent. Left Miscellaneous Measurements     - The average kidney length is 10.81 cm. - The left renal vein is patent. Creat 0.7    Assessment:      Hypertension - recent onset with poor control (1 medication)  Right renal artery stenosis by duplex scan      Plan:      CTA abd/pelvis for confirmation and anatomic details prior to proceeding with angio and intervention.   Explained pathophysiology of renovascular HTN and possibility of fibromuscular dysplasia in a young women with new onset HTN. Agreeable to w/u and treatment as appropriate. Will get CTA and call with results and options. Time with pt 45 mins.

## 2023-03-13 ENCOUNTER — TELEPHONE (OUTPATIENT)
Dept: FAMILY MEDICINE CLINIC | Age: 33
End: 2023-03-13

## 2023-03-13 NOTE — TELEPHONE ENCOUNTER
Pt called with BP readings. 6th 9:00 am 173/114 p 85, 4:00 pm 143/103 P 80, 7th 4:00 /109 P 85, 11th 7:00 pm 162/110 P 74, 12th 10:15 /102 P 82 and 13 th 8:30 am 139/109 P 88.  Pt is reachable at [phone]

## 2023-03-14 ENCOUNTER — HOSPITAL ENCOUNTER (OUTPATIENT)
Dept: CT IMAGING | Age: 33
Discharge: HOME | End: 2023-03-14
Payer: COMMERCIAL

## 2023-03-14 DIAGNOSIS — I70.1 RENAL ARTERY STENOSIS (HCC): ICD-10-CM

## 2023-03-14 DIAGNOSIS — I15.0 RENOVASCULAR HYPERTENSION: ICD-10-CM

## 2023-03-14 LAB
CREAT SERPL-MCNC: 0.6 MG/DL (ref 0.6–1.1)
GFR SERPLBLD CREATININE-BSD FMLA CKD-EPI: >60 ML/MIN/{1.73_M2}

## 2023-03-14 PROCEDURE — 82565 ASSAY OF CREATININE: CPT

## 2023-03-14 PROCEDURE — 6360000004 HC RX CONTRAST MEDICATION: Performed by: SURGERY

## 2023-03-14 PROCEDURE — 36415 COLL VENOUS BLD VENIPUNCTURE: CPT

## 2023-03-14 PROCEDURE — 74174 CTA ABD&PLVS W/CONTRAST: CPT

## 2023-03-14 RX ADMIN — IOPAMIDOL 100 ML: 755 INJECTION, SOLUTION INTRAVENOUS at 07:29

## 2023-03-14 NOTE — TELEPHONE ENCOUNTER
I have call into Dr. Nash's office for recommendation on 2nd med with her DERICK, I have sent my chart message to Amaris with update

## 2023-03-20 ENCOUNTER — TELEPHONE (OUTPATIENT)
Dept: VASCULAR SURGERY | Age: 33
End: 2023-03-20

## 2023-03-20 NOTE — TELEPHONE ENCOUNTER
Patient would like to go ahead with the scheduling of her tests (angio) at [facility].  I told her Dr. Raul Alberto is out of town until 3/28/23. Please call her regarding when this can be done at [phone]. She is aware you might call her tomorrow.

## 2023-03-21 ENCOUNTER — TELEPHONE (OUTPATIENT)
Dept: FAMILY MEDICINE CLINIC | Age: 33
End: 2023-03-21

## 2023-03-21 RX ORDER — HYDRALAZINE HYDROCHLORIDE 10 MG/1
20 TABLET, FILM COATED ORAL 4 TIMES DAILY
Qty: 120 TABLET | Refills: 0
Start: 2023-03-21 | End: 2024-03-20

## 2023-03-21 NOTE — TELEPHONE ENCOUNTER
Pt called stating she was told to call in with her BP readings since being put on new medication. BP was taken early in the morning and later in the evening on most days. 15th 167/125 p 103, 16th 171/122 p 90, 16th 162/106 p 99, 16th 166/122 p 88, 17th 159/104 p 66, 17th 185/106 p 99, 18th 142/104 p 94, 18th 141/101 p 92, 19th 146/95 p 91, 19th 144/97 p 89, 20th 146/110 p 83, 20th 143/95 p 84, 20th 130/92 p 75, 21th 141/99 p 78.

## 2023-03-22 NOTE — PROGRESS NOTES
There has been improvement in past few days.  I would like her to increase the hydralazine to 20 mg 4 x per day from 10 mg 4 x per day and update me on her bp readings in 1 week

## 2023-03-28 ENCOUNTER — HOSPITAL ENCOUNTER (OUTPATIENT)
Dept: CARDIAC CATH/INVASIVE PROCEDURES | Age: 33
Setting detail: OBSERVATION
Discharge: HOME | End: 2023-03-29
Attending: SURGERY | Admitting: SURGERY
Payer: COMMERCIAL

## 2023-03-28 ENCOUNTER — PREP FOR PROCEDURE (OUTPATIENT)
Dept: VASCULAR SURGERY | Age: 33
End: 2023-03-28

## 2023-03-28 PROBLEM — I77.3 RENAL ARTERY STENOSIS DUE TO FIBROMUSCULAR DYSPLASIA (HCC): Status: ACTIVE | Noted: 2023-02-09

## 2023-03-28 LAB
ANION GAP SERPL CALCULATED.3IONS-SCNC: 12 MMOL/L (ref 3–16)
BUN SERPL-MCNC: 9 MG/DL (ref 7–20)
CALCIUM SERPL-MCNC: 9.1 MG/DL (ref 8.3–10.6)
CHLORIDE SERPL-SCNC: 102 MMOL/L (ref 99–110)
CO2 SERPL-SCNC: 24 MMOL/L (ref 21–32)
CREAT SERPL-MCNC: 0.6 MG/DL (ref 0.6–1.1)
DEPRECATED RDW RBC AUTO: 13.9 % (ref 12.4–15.4)
GFR SERPLBLD CREATININE-BSD FMLA CKD-EPI: >60 ML/MIN/{1.73_M2}
GLUCOSE SERPL-MCNC: 102 MG/DL (ref 70–99)
HCT VFR BLD AUTO: 35.7 % (ref 36–48)
HGB BLD-MCNC: 12.3 G/DL (ref 12–16)
MCH RBC QN AUTO: 29.8 PG (ref 26–34)
MCHC RBC AUTO-ENTMCNC: 34.5 G/DL (ref 31–36)
MCV RBC AUTO: 86.4 FL (ref 80–100)
PLATELET # BLD AUTO: 237 K/UL (ref 135–450)
PMV BLD AUTO: 8.7 FL (ref 5–10.5)
POC ACT LR: 246 SEC
POC ACT LR: 341 SEC
POTASSIUM SERPL-SCNC: 3.8 MMOL/L (ref 3.5–5.1)
RBC # BLD AUTO: 4.14 M/UL (ref 4–5.2)
SODIUM SERPL-SCNC: 138 MMOL/L (ref 136–145)
WBC # BLD AUTO: 8.2 K/UL (ref 4–11)

## 2023-03-28 PROCEDURE — C1894 INTRO/SHEATH, NON-LASER: HCPCS

## 2023-03-28 PROCEDURE — C1725 CATH, TRANSLUMIN NON-LASER: HCPCS

## 2023-03-28 PROCEDURE — 99152 MOD SED SAME PHYS/QHP 5/>YRS: CPT | Performed by: SURGERY

## 2023-03-28 PROCEDURE — 85027 COMPLETE CBC AUTOMATED: CPT

## 2023-03-28 PROCEDURE — C1760 CLOSURE DEV, VASC: HCPCS

## 2023-03-28 PROCEDURE — 37246 TRLUML BALO ANGIOP 1ST ART: CPT

## 2023-03-28 PROCEDURE — 2580000003 HC RX 258: Performed by: SURGERY

## 2023-03-28 PROCEDURE — C1769 GUIDE WIRE: HCPCS

## 2023-03-28 PROCEDURE — 85347 COAGULATION TIME ACTIVATED: CPT

## 2023-03-28 PROCEDURE — 80048 BASIC METABOLIC PNL TOTAL CA: CPT

## 2023-03-28 PROCEDURE — 2709999900 HC NON-CHARGEABLE SUPPLY

## 2023-03-28 PROCEDURE — C1887 CATHETER, GUIDING: HCPCS

## 2023-03-28 PROCEDURE — 6360000002 HC RX W HCPCS

## 2023-03-28 PROCEDURE — 51701 INSERT BLADDER CATHETER: CPT

## 2023-03-28 PROCEDURE — 36252 INS CATH REN ART 1ST BILAT: CPT

## 2023-03-28 PROCEDURE — 37246 TRLUML BALO ANGIOP 1ST ART: CPT | Performed by: SURGERY

## 2023-03-28 PROCEDURE — G0378 HOSPITAL OBSERVATION PER HR: HCPCS

## 2023-03-28 PROCEDURE — 6360000004 HC RX CONTRAST MEDICATION: Performed by: SURGERY

## 2023-03-28 PROCEDURE — 6370000000 HC RX 637 (ALT 250 FOR IP): Performed by: SURGERY

## 2023-03-28 PROCEDURE — 36252 INS CATH REN ART 1ST BILAT: CPT | Performed by: SURGERY

## 2023-03-28 PROCEDURE — 2100000000 HC CCU R&B

## 2023-03-28 PROCEDURE — 99152 MOD SED SAME PHYS/QHP 5/>YRS: CPT

## 2023-03-28 PROCEDURE — 99153 MOD SED SAME PHYS/QHP EA: CPT

## 2023-03-28 PROCEDURE — 76937 US GUIDE VASCULAR ACCESS: CPT | Performed by: SURGERY

## 2023-03-28 PROCEDURE — 2500000003 HC RX 250 WO HCPCS

## 2023-03-28 RX ORDER — SODIUM CHLORIDE 9 MG/ML
INJECTION, SOLUTION INTRAVENOUS PRN
Status: CANCELLED | OUTPATIENT
Start: 2023-03-28

## 2023-03-28 RX ORDER — ACETAMINOPHEN 325 MG/1
650 TABLET ORAL EVERY 4 HOURS PRN
Status: DISCONTINUED | OUTPATIENT
Start: 2023-03-28 | End: 2023-03-29 | Stop reason: HOSPADM

## 2023-03-28 RX ORDER — CYCLOBENZAPRINE HCL 10 MG
10 TABLET ORAL 2 TIMES DAILY PRN
Status: DISCONTINUED | OUTPATIENT
Start: 2023-03-28 | End: 2023-03-29 | Stop reason: HOSPADM

## 2023-03-28 RX ORDER — HYDROCODONE BITARTRATE AND ACETAMINOPHEN 5; 325 MG/1; MG/1
1 TABLET ORAL EVERY 4 HOURS PRN
Status: DISCONTINUED | OUTPATIENT
Start: 2023-03-28 | End: 2023-03-29 | Stop reason: HOSPADM

## 2023-03-28 RX ORDER — SODIUM CHLORIDE 9 MG/ML
INJECTION, SOLUTION INTRAVENOUS CONTINUOUS
Status: DISCONTINUED | OUTPATIENT
Start: 2023-03-28 | End: 2023-03-29

## 2023-03-28 RX ORDER — CLOPIDOGREL BISULFATE 75 MG/1
75 TABLET ORAL DAILY
Status: DISCONTINUED | OUTPATIENT
Start: 2023-03-29 | End: 2023-03-29 | Stop reason: HOSPADM

## 2023-03-28 RX ORDER — ONDANSETRON 2 MG/ML
4 INJECTION INTRAMUSCULAR; INTRAVENOUS EVERY 8 HOURS PRN
Status: DISCONTINUED | OUTPATIENT
Start: 2023-03-28 | End: 2023-03-29 | Stop reason: HOSPADM

## 2023-03-28 RX ORDER — MIRTAZAPINE 15 MG/1
30 TABLET, FILM COATED ORAL NIGHTLY
Status: DISCONTINUED | OUTPATIENT
Start: 2023-03-28 | End: 2023-03-29 | Stop reason: HOSPADM

## 2023-03-28 RX ORDER — GABAPENTIN 300 MG/1
600 CAPSULE ORAL 3 TIMES DAILY
Status: DISCONTINUED | OUTPATIENT
Start: 2023-03-28 | End: 2023-03-29 | Stop reason: HOSPADM

## 2023-03-28 RX ORDER — DULOXETIN HYDROCHLORIDE 30 MG/1
30 CAPSULE, DELAYED RELEASE ORAL DAILY
Status: DISCONTINUED | OUTPATIENT
Start: 2023-03-28 | End: 2023-03-28

## 2023-03-28 RX ORDER — SODIUM CHLORIDE 0.9 % (FLUSH) 0.9 %
5-40 SYRINGE (ML) INJECTION EVERY 12 HOURS SCHEDULED
Status: CANCELLED | OUTPATIENT
Start: 2023-03-28

## 2023-03-28 RX ORDER — MIRTAZAPINE 15 MG/1
30 TABLET, FILM COATED ORAL NIGHTLY
Status: DISCONTINUED | OUTPATIENT
Start: 2023-03-28 | End: 2023-03-28

## 2023-03-28 RX ORDER — CLOPIDOGREL BISULFATE 75 MG/1
300 TABLET ORAL ONCE
Status: COMPLETED | OUTPATIENT
Start: 2023-03-28 | End: 2023-03-28

## 2023-03-28 RX ORDER — DULOXETIN HYDROCHLORIDE 60 MG/1
60 CAPSULE, DELAYED RELEASE ORAL DAILY
Status: DISCONTINUED | OUTPATIENT
Start: 2023-03-29 | End: 2023-03-29 | Stop reason: HOSPADM

## 2023-03-28 RX ORDER — HYDROCODONE BITARTRATE AND ACETAMINOPHEN 5; 325 MG/1; MG/1
2 TABLET ORAL EVERY 4 HOURS PRN
Status: DISCONTINUED | OUTPATIENT
Start: 2023-03-28 | End: 2023-03-29 | Stop reason: HOSPADM

## 2023-03-28 RX ORDER — SODIUM CHLORIDE 0.9 % (FLUSH) 0.9 %
5-40 SYRINGE (ML) INJECTION PRN
Status: CANCELLED | OUTPATIENT
Start: 2023-03-28

## 2023-03-28 RX ORDER — ASPIRIN 325 MG
325 TABLET ORAL DAILY
Status: DISCONTINUED | OUTPATIENT
Start: 2023-03-28 | End: 2023-03-29 | Stop reason: HOSPADM

## 2023-03-28 RX ADMIN — MIRTAZAPINE 30 MG: 15 TABLET, FILM COATED ORAL at 20:39

## 2023-03-28 RX ADMIN — IOPAMIDOL 100 ML: 755 INJECTION, SOLUTION INTRAVENOUS at 14:05

## 2023-03-28 RX ADMIN — ASPIRIN 325 MG: 325 TABLET ORAL at 16:24

## 2023-03-28 RX ADMIN — CLOPIDOGREL BISULFATE 300 MG: 75 TABLET ORAL at 16:24

## 2023-03-28 RX ADMIN — SODIUM CHLORIDE: 9 INJECTION, SOLUTION INTRAVENOUS at 14:36

## 2023-03-28 RX ADMIN — GABAPENTIN 600 MG: 300 CAPSULE ORAL at 20:39

## 2023-03-28 RX ADMIN — GABAPENTIN 600 MG: 300 CAPSULE ORAL at 16:24

## 2023-03-28 RX ADMIN — SODIUM CHLORIDE: 9 INJECTION, SOLUTION INTRAVENOUS at 16:03

## 2023-03-28 ASSESSMENT — PAIN SCALES - GENERAL
PAINLEVEL_OUTOF10: 0
PAINLEVEL_OUTOF10: 0
PAINLEVEL_OUTOF10: 2

## 2023-03-28 ASSESSMENT — PAIN DESCRIPTION - ORIENTATION: ORIENTATION: RIGHT

## 2023-03-28 ASSESSMENT — PAIN DESCRIPTION - DESCRIPTORS: DESCRIPTORS: DISCOMFORT

## 2023-03-28 ASSESSMENT — PAIN DESCRIPTION - LOCATION: LOCATION: GROIN

## 2023-03-28 ASSESSMENT — PAIN - FUNCTIONAL ASSESSMENT: PAIN_FUNCTIONAL_ASSESSMENT: ACTIVITIES ARE NOT PREVENTED

## 2023-03-28 NOTE — PROGRESS NOTES
Pt from OR [facility] after an Aortogram with B renal artery angiograms; R renal artery angioplasty (5mm x 20 mm). Pt in CVU for close BPmonitoring. Pressure is above goal at this time. Pharmacy messaged to fill nitroprusside.   Pt with palpable pulses which were verified by doppler equal movements/lifts head/looks at caregiver's face/responds to noises/smiles spontaneously

## 2023-03-28 NOTE — BRIEF OP NOTE
Brief Postoperative Note      Patient: Elizabeth El  YOB: 1990  MRN: [id number]    Date of Procedure: 3/28/2023    Pre-Op Diagnosis: Severe HTN - possible FMD renal artery stenosis    Post-Op Diagnosis: Same       Procedure: Aortogram with B renal artery angiograms; R renal artery angioplasty (5mm x 20 mm)    Anesthesia: local + sedation    Estimated Blood Loss (mL): less than 50     Complications: None    Specimens:   * Cannot find log *    Implants:  * No surgical log found *      Drains: * No LDAs found *    Findings: no aortic disease; severe distal main R renal artery stenosis (FMD); FMD L renal artery without stenoses.   Successful balloon angioplasty without residual stenosis    Electronically signed by Cat Bonilla MD on 3/28/2023 at 2:08 PM

## 2023-03-28 NOTE — PRE SEDATION
Sedation Pre-Procedure Note    Patient Name: Huber Thompson   YOB: 1990  Room/Bed: Room/bed info not found  Medical Record Number: [id number]  Date: 3/28/2023   Time: 12:44 PM       Indication:  new onset HTN    Consent: I have discussed with the patient and/or the patient representative the indication, alternatives, and the possible risks and/or complications of the planned procedure and the anesthesia methods. The patient and/or patient representative appear to understand and agree to proceed. Vital Signs:   Vitals:    03/28/23 1157   BP: (!) 148/102   Pulse: 71   Resp: 16   Temp: 98.2 °F (36.8 °C)   SpO2: 100%       Past Medical History:   has a past medical history of Carpal tunnel syndrome, Chronic pain, Depression, Hypertension, Insomnia, and Thyroid disease. Past Surgical History:   has no past surgical history on file. Medications:   Scheduled Meds:   Continuous Infusions:   PRN Meds:   Home Meds:   Prior to Admission medications    Medication Sig Start Date End Date Taking? Authorizing Provider   hydrALAZINE (APRESOLINE) 10 MG tablet Take 2 tablets by mouth 4 times daily 3/21/23 3/20/24  Thom Hua DO   gabapentin (NEURONTIN) 600 MG tablet Take 1 tablet by mouth 3 times daily for 180 days.  2/24/23 8/23/23  Thom Hua DO   DULoxetine (CYMBALTA) 60 MG extended release capsule Take 1 capsule by mouth daily Dose increased 2/2/23   Thom Hua DO   cyclobenzaprine (FLEXERIL) 10 MG tablet Take 1 tablet by mouth 2 times daily as needed for Muscle spasms 1/5/23   Thom Hua DO   amLODIPine (NORVASC) 10 MG tablet Take 1 tablet by mouth daily 1/5/23   Thom Hua DO   mirtazapine (REMERON) 30 MG tablet Take 1 tablet by mouth nightly Dose increased 12/13/22   Thom Hua DO   DULoxetine (CYMBALTA) 30 MG extended release capsule TAKE ONE CAPSULE BY MOUTH DAILY 7/18/22   Thom Hua DO     Coumadin Use Last 7 Days: no  Antiplatelet drug therapy use last 7 days: no  Other anticoagulant use last 7 days: no  Additional Medication Information:  see above      Pre-Sedation Documentation and Exam:   I have personally completed a history, physical exam & review of systems for this patient (see notes).     Mallampati Airway Assessment:  Mallampati Class II - (soft palate, fauces & uvula are visible)    Prior History of Anesthesia Complications:   none    ASA Classification:  Class 2 - A normal healthy patient with mild systemic disease    Sedation/ Anesthesia Plan:   intravenous sedation    Medications Planned:   midazolam (Versed) intravenously and fentanyl intravenously    Patient is an appropriate candidate for plan of sedation: yes    Electronically signed by Omayra Paul MD on 3/28/2023 at 12:44 PM

## 2023-03-28 NOTE — FLOWSHEET NOTE
Pt still on bedrest.  Unable to pee on bedpan.   Order from Dr Nikita Dunn to [id number]        03/28/23 1530   Urine Assessment   Intermittent/Straight Cath (mL) (S)  900 mL

## 2023-03-28 NOTE — H&P
Update History & Physical    The patient's History and Physical of March 8, 2023 was reviewed with the patient and I examined the patient. There was no change. The surgical site was confirmed by the patient and me. Plan: The risks, benefits, expected outcome, and alternative to the recommended procedure have been discussed with the patient. Patient understands and wants to proceed with the procedure.      Electronically signed by Claud Harada, MD on 3/28/2023 at 12:43 PM

## 2023-03-28 NOTE — PROGRESS NOTES
4 Eyes Skin Assessment     NAME:  Goldie Quinteros OF BIRTH:  1990  MEDICAL RECORD NUMBER:  [id number]    The patient is being assessed for  Shift Handoff    I agree that One RN has performed a thorough Head to Toe Skin Assessment on the patient. ALL assessment sites listed below have been assessed. Areas assessed by both nurses:    Head, Face, Ears, Shoulders, Back, Chest, Arms, Elbows, Hands, Sacrum. Buttock, Coccyx, Ischium, and Legs. Feet and Heels        Does the Patient have a Wound?  No noted wound(s)       Alfonzo Prevention initiated by RN: No   Wound Care Orders initiated by RN: No    Pressure Injury (Stage 3,4, Unstageable, DTI, NWPT, and Complex wounds) if present, place referral order by RN under : NA    New and Established Ostomies, if present place, referral order under : NA      Nurse 1 eSignature: Electronically signed by Laly Carballo RN on 3/28/23 at 6:46 PM EDT    **SHARE this note so that the co-signing nurse can place an eSignature**    Nurse 2 eSignature: Electronically signed by Eduardo Hernandes RN on 3/28/23 at 7:23 PM EDT

## 2023-03-29 VITALS
HEIGHT: 64 IN | BODY MASS INDEX: 19.12 KG/M2 | OXYGEN SATURATION: 98 % | SYSTOLIC BLOOD PRESSURE: 149 MMHG | WEIGHT: 112 LBS | RESPIRATION RATE: 18 BRPM | DIASTOLIC BLOOD PRESSURE: 107 MMHG | TEMPERATURE: 98.1 F | HEART RATE: 65 BPM

## 2023-03-29 LAB
ANION GAP SERPL CALCULATED.3IONS-SCNC: 10 MMOL/L (ref 3–16)
BUN SERPL-MCNC: 9 MG/DL (ref 7–20)
CALCIUM SERPL-MCNC: 8.9 MG/DL (ref 8.3–10.6)
CHLORIDE SERPL-SCNC: 108 MMOL/L (ref 99–110)
CO2 SERPL-SCNC: 22 MMOL/L (ref 21–32)
CREAT SERPL-MCNC: <0.5 MG/DL (ref 0.6–1.1)
GFR SERPLBLD CREATININE-BSD FMLA CKD-EPI: >60 ML/MIN/{1.73_M2}
GLUCOSE SERPL-MCNC: 101 MG/DL (ref 70–99)
POTASSIUM SERPL-SCNC: 3.8 MMOL/L (ref 3.5–5.1)
SODIUM SERPL-SCNC: 140 MMOL/L (ref 136–145)

## 2023-03-29 PROCEDURE — 80048 BASIC METABOLIC PNL TOTAL CA: CPT

## 2023-03-29 PROCEDURE — 36415 COLL VENOUS BLD VENIPUNCTURE: CPT

## 2023-03-29 PROCEDURE — G0378 HOSPITAL OBSERVATION PER HR: HCPCS

## 2023-03-29 PROCEDURE — 2580000003 HC RX 258: Performed by: SURGERY

## 2023-03-29 PROCEDURE — 99231 SBSQ HOSP IP/OBS SF/LOW 25: CPT | Performed by: SURGERY

## 2023-03-29 PROCEDURE — 6370000000 HC RX 637 (ALT 250 FOR IP): Performed by: SURGERY

## 2023-03-29 PROCEDURE — 94760 N-INVAS EAR/PLS OXIMETRY 1: CPT

## 2023-03-29 RX ORDER — CLOPIDOGREL BISULFATE 75 MG/1
75 TABLET ORAL DAILY
Qty: 30 TABLET | Refills: 3 | Status: SHIPPED | OUTPATIENT
Start: 2023-03-29

## 2023-03-29 RX ORDER — ASPIRIN 81 MG/1
81 TABLET ORAL DAILY
Qty: 90 TABLET | Refills: 1 | Status: SHIPPED | OUTPATIENT
Start: 2023-03-29

## 2023-03-29 RX ORDER — AMLODIPINE BESYLATE 10 MG/1
10 TABLET ORAL DAILY
Status: DISCONTINUED | OUTPATIENT
Start: 2023-03-29 | End: 2023-03-29 | Stop reason: HOSPADM

## 2023-03-29 RX ADMIN — GABAPENTIN 600 MG: 300 CAPSULE ORAL at 07:45

## 2023-03-29 RX ADMIN — AMLODIPINE BESYLATE 10 MG: 10 TABLET ORAL at 07:45

## 2023-03-29 RX ADMIN — DULOXETINE HYDROCHLORIDE 60 MG: 60 CAPSULE, DELAYED RELEASE ORAL at 07:44

## 2023-03-29 RX ADMIN — CLOPIDOGREL BISULFATE 75 MG: 75 TABLET ORAL at 07:45

## 2023-03-29 RX ADMIN — SODIUM CHLORIDE: 9 INJECTION, SOLUTION INTRAVENOUS at 03:42

## 2023-03-29 RX ADMIN — ASPIRIN 325 MG: 325 TABLET ORAL at 07:45

## 2023-03-29 ASSESSMENT — PAIN SCALES - GENERAL
PAINLEVEL_OUTOF10: 0

## 2023-03-29 NOTE — DISCHARGE INSTRUCTIONS
May walk as tolerated. May RTW but no heavy lifting until Monday. BP med management per PCP. F/U 4 weeks.

## 2023-03-29 NOTE — PROGRESS NOTES
VASCULAR  PPD#1    Feels good. No complaints. BP currently 147/97 - earlier was in the 035O systolic with diastolics of 47P  Good UO  R groin soft without hematoma  Palpable R pedal pulse    Creat nl    A/P: S/P R renal artery angioplasty for FMD and renovascular HTN   Early response reasonable without the need for added parenteral meds for control. Will add Norvasc back as only agent at DC today. F/U with PCP for med management. Continue at home BP checks. May RTW tomorrow with heavy lifting. F/U 4 weeks.      Kyung Colin

## 2023-03-29 NOTE — PLAN OF CARE
Problem: Discharge Planning  Goal: Discharge to home or other facility with appropriate resources  Outcome: Progressing  Flowsheets (Taken 3/28/2023 2000)  Discharge to home or other facility with appropriate resources:   Identify barriers to discharge with patient and caregiver   Arrange for needed discharge resources and transportation as appropriate   Identify discharge learning needs (meds, wound care, etc)   Arrange for interpreters to assist at discharge as needed   Refer to discharge planning if patient needs post-hospital services based on physician order or complex needs related to functional status, cognitive ability or social support system     Problem: ABCDS Injury Assessment  Goal: Absence of physical injury  Outcome: Progressing     Problem: Pain  Goal: Verbalizes/displays adequate comfort level or baseline comfort level  Outcome: Progressing  Flowsheets (Taken 3/28/2023 2000)  Verbalizes/displays adequate comfort level or baseline comfort level:   Encourage patient to monitor pain and request assistance   Assess pain using appropriate pain scale   Administer analgesics based on type and severity of pain and evaluate response   Implement non-pharmacological measures as appropriate and evaluate response   Consider cultural and social influences on pain and pain management   Notify Licensed Independent Practitioner if interventions unsuccessful or patient reports new pain

## 2023-03-29 NOTE — PROGRESS NOTES
RN reviewed discharge instructions and medications with patient at bedside. All questions answered. Patient made aware she is to follow up with Dr. Margarete Bamberger in 4 weeks. Discharge instructions sent home with patient. PIV removed. RN escorted patient to car via wheelchair. Girlfriend picked patient up to take home.

## 2023-03-29 NOTE — OP NOTE
[address] [place] [address]                                OPERATIVE REPORT    PATIENT NAME: Toribio Lira               :        1990  MED REC NO:   [id number]                          ROOM:       46  ACCOUNT NO:   [de-identified]                           ADMIT DATE: 2023  PROVIDER:     Simran García MD    DATE OF PROCEDURE:  2023    PREPROCEDURE DIAGNOSIS:  Recent onset severe hypertension with  suggestion of fibromuscular dysplasia, right renal artery. POSTPROCEDURE DIAGNOSIS:  Recent onset severe hypertension with  suggestion of fibromuscular dysplasia, right renal artery. OPERATION PERFORMED:  1. Ultrasound-guided right femoral catheterization. 2.  Aortogram via suprarenal catheter placement. 3.  Selective right renal artery angiogram.  4.  Selective left renal artery angiogram.  5.  Balloon angioplasty, distal right renal artery ([place] 5 mm x 20 mm  balloon). SURGEON:  Simran García MD    ANESTHESIA:  Local with sedation (total sedation time 64 minutes). Versed 4 mg, fentanyl 100 mcg IV push with continuous oxygen saturation,  EKG and blood pressure monitoring as well as continuous nursing  supervision and observation. ESTIMATED BLOOD LOSS:  Less than 50 mL. HISTORY:  The patient is a 78-year-old lady, who within the last 6 to 12  months has developed progressively worsening hypertension, unresponsive  to medication. She underwent a duplex scan of her renal arteries, which  suggested a right renal artery stenosis of severe nature with a peak  velocity of over 900 cm/sec. CTA was done, which was described as  normal, however, on personal review appeared to have signs of  fibromuscular dysplasia. It was recommended she undergo the definitive  angiogram and treatment as possible. She agreed and understands the  risks, benefits, and other options.     TECHNIQUE:  The patient under  fluoroscopic guidance into the origin of the right renal artery. Imaging was maintained at 24 degrees right anterior oblique. Hand  injection of contrast was performed, which once again identified the  stenosis. Measurements had been taken and a 5 mm x 20 mm [place]  balloon was chosen. It was advanced across the stenosis and inflated  for 2 minutes to 10 atmospheres. Following completion of this, it was  deflated and hand injections were performed again, which demonstrated  excellent result with elimination of the stenosis. The sheath and wire  were then removed. The Omni catheter was then used to engage the origin  of the left renal artery. Stiff Glidewire was advanced distally and  that catheter was removed and replaced with the angled glide catheter. Hand injection of contrast was performed to evaluate the left renal  artery, which demonstrated evidence of FMD, but no severe stenosis. The  catheter was removed. Stiff Glidewire remained in the aorta and the  sheath was removed. The puncture site was closed using a standard  Perclose technique without difficulty. The patient tolerated the  procedure well. FINDINGS:  1. No evidence of aortic stenotic disease. 2.  Severe greater than 95% stenosis of the distal right renal artery  with characteristics of fibromuscular dysplasia. 3.  No evidence of left renal artery stenosis. However, there is  evidence of fibromuscular dysplasia. 4.  Successful balloon angioplasty of right renal artery stenosis as  described above.         Thom Monge MD    D: 03/28/2023 14:56:50       T: 03/28/2023 22:11:23     [id number]  Job#: [id number]     Doc#: [id number]    CC:

## 2023-03-30 ENCOUNTER — TELEPHONE (OUTPATIENT)
Dept: FAMILY MEDICINE CLINIC | Age: 33
End: 2023-03-30

## 2023-03-30 NOTE — TELEPHONE ENCOUNTER
Care Transitions Initial Follow Up Call    Outreach made within 2 business days of discharge: Yes    Patient: Jodie Magaña Patient : 1990   MRN: [id number]  Reason for Admission: There are no discharge diagnoses documented for the most recent discharge. Discharge Date: 3/29/23       Spoke with: Jodie Magaña      Discharge department/facility: [facility] Patient Contact:  Was patient able to fill all prescriptions: Yes  Was patient instructed to bring all medications to the follow-up visit: Yes  Is patient taking all medications as directed in the discharge summary? Yes  Does patient understand their discharge instructions: Yes  Does patient have questions or concerns that need addressed prior to 7-14 day follow up office visit: yes - discharge instructions states to take amlodipine 10 mg once daily but patient was told by Dr RAJAN to reduce the medication to a half a tab daily. Please advise.     Scheduled appointment with PCP within 7-14 days    Follow Up  Future Appointments   Date Time Provider Ariana Gutierrez   4/10/2023  9:45 AM DO CARMEN Olmos [place], [place]

## 2023-04-05 ENCOUNTER — TELEPHONE (OUTPATIENT)
Dept: FAMILY MEDICINE CLINIC | Age: 33
End: 2023-04-05

## 2023-04-05 NOTE — TELEPHONE ENCOUNTER
Pt called stating she is very dizzy lately. Pt is wondering if it is caused from her BP being so low. BP readings from 2:00 pm and 6:30 PM yesterday. 94/57 Pulse 79 and 104/63 Pulse 73. Pt stated she was lowered from 1 tablet to half a tablet of her BP medication last time she saw doctor C. Pt is wondering if she needs something else or to stop taking the medication. Please advise.  Pt is reachable at [phone]

## 2023-04-05 NOTE — TELEPHONE ENCOUNTER
Stop med entirely.   Call in 2 days w progress; sooner if worsens    OV next week DR RAJAN - revisit all of above

## 2023-04-06 RX ORDER — AMLODIPINE BESYLATE 10 MG/1
TABLET ORAL
Qty: 90 TABLET | Refills: 0 | Status: SHIPPED | OUTPATIENT
Start: 2023-04-06

## 2023-04-10 PROBLEM — F41.8 DEPRESSION WITH ANXIETY: Status: ACTIVE | Noted: 2023-04-10

## 2023-04-10 PROBLEM — I10 WHITE COAT SYNDROME WITH HYPERTENSION: Status: RESOLVED | Noted: 2022-07-07 | Resolved: 2023-04-10

## 2023-04-14 ENCOUNTER — TELEPHONE (OUTPATIENT)
Dept: VASCULAR SURGERY | Age: 33
End: 2023-04-14

## 2023-04-17 ENCOUNTER — TELEPHONE (OUTPATIENT)
Dept: ORTHOPEDIC SURGERY | Age: 33
End: 2023-04-17

## 2023-04-17 NOTE — TELEPHONE ENCOUNTER
Auth: NPR  Date: 5/2/2023  Reference # [id number] 4/17/23  Spoke with: Michelle WOODALL [phone]  Type of SX: OUTPATIENT  Location: [facility]  CPT: 10967   DX: G56.02  SX area: L CARPAL  Insurance: [facility]

## 2023-04-24 ENCOUNTER — TELEPHONE (OUTPATIENT)
Dept: ORTHOPEDIC SURGERY | Age: 33
End: 2023-04-24

## 2023-04-24 ENCOUNTER — TELEPHONE (OUTPATIENT)
Dept: CARDIOTHORACIC SURGERY | Age: 33
End: 2023-04-24

## 2023-04-24 NOTE — TELEPHONE ENCOUNTER
Surgery and/or Procedure Scheduling     Contact Name: aMrian Neville"  Surgical/Procedure Request: Right CTS Release  Patient Contact Number: [phone]       Patient calling to set up Sx for Right Hand CTS Release.   Please advise

## 2023-04-24 NOTE — TELEPHONE ENCOUNTER
Patient has upcoming surgery for carpal tunnel May 2, patient has questions about stopping medications tomorrow for surgery. Please call to advise.

## 2023-04-24 NOTE — TELEPHONE ENCOUNTER
Pt is calling because pt has surgery in May and needs to know if she needs to stop taking 2 medicines. She said she needs to know today. Please give pt a call.

## 2023-04-27 ENCOUNTER — OFFICE VISIT (OUTPATIENT)
Dept: FAMILY MEDICINE CLINIC | Age: 33
End: 2023-04-27
Payer: COMMERCIAL

## 2023-04-27 ENCOUNTER — TELEPHONE (OUTPATIENT)
Dept: ORTHOPEDIC SURGERY | Age: 33
End: 2023-04-27

## 2023-04-27 VITALS
SYSTOLIC BLOOD PRESSURE: 110 MMHG | BODY MASS INDEX: 19.12 KG/M2 | OXYGEN SATURATION: 99 % | HEIGHT: 64 IN | HEART RATE: 75 BPM | WEIGHT: 112 LBS | DIASTOLIC BLOOD PRESSURE: 72 MMHG

## 2023-04-27 DIAGNOSIS — Z01.818 PREOP EXAMINATION: Primary | ICD-10-CM

## 2023-04-27 DIAGNOSIS — F41.8 DEPRESSION WITH ANXIETY: ICD-10-CM

## 2023-04-27 LAB
ANION GAP SERPL CALCULATED.3IONS-SCNC: 10 MMOL/L (ref 3–16)
BASOPHILS # BLD: 0.1 K/UL (ref 0–0.2)
BASOPHILS NFR BLD: 1 %
BUN SERPL-MCNC: 10 MG/DL (ref 7–20)
CALCIUM SERPL-MCNC: 9.8 MG/DL (ref 8.3–10.6)
CHLORIDE SERPL-SCNC: 105 MMOL/L (ref 99–110)
CO2 SERPL-SCNC: 26 MMOL/L (ref 21–32)
CREAT SERPL-MCNC: 0.8 MG/DL (ref 0.6–1.1)
DEPRECATED RDW RBC AUTO: 14.2 % (ref 12.4–15.4)
EOSINOPHIL # BLD: 0 K/UL (ref 0–0.6)
EOSINOPHIL NFR BLD: 1 %
GFR SERPLBLD CREATININE-BSD FMLA CKD-EPI: >60 ML/MIN/{1.73_M2}
GLUCOSE SERPL-MCNC: 73 MG/DL (ref 70–99)
HCT VFR BLD AUTO: 37.6 % (ref 36–48)
HGB BLD-MCNC: 12.3 G/DL (ref 12–16)
LYMPHOCYTES # BLD: 1.4 K/UL (ref 1–5.1)
LYMPHOCYTES NFR BLD: 28.5 %
MCH RBC QN AUTO: 30.1 PG (ref 26–34)
MCHC RBC AUTO-ENTMCNC: 32.8 G/DL (ref 31–36)
MCV RBC AUTO: 91.8 FL (ref 80–100)
MONOCYTES # BLD: 0.3 K/UL (ref 0–1.3)
MONOCYTES NFR BLD: 6.5 %
NEUTROPHILS # BLD: 3.2 K/UL (ref 1.7–7.7)
NEUTROPHILS NFR BLD: 63 %
PLATELET # BLD AUTO: 215 K/UL (ref 135–450)
PMV BLD AUTO: 9.6 FL (ref 5–10.5)
POTASSIUM SERPL-SCNC: 4.5 MMOL/L (ref 3.5–5.1)
RBC # BLD AUTO: 4.09 M/UL (ref 4–5.2)
SODIUM SERPL-SCNC: 141 MMOL/L (ref 136–145)
WBC # BLD AUTO: 5 K/UL (ref 4–11)

## 2023-04-27 PROCEDURE — 99213 OFFICE O/P EST LOW 20 MIN: CPT | Performed by: NURSE PRACTITIONER

## 2023-04-27 RX ORDER — DULOXETIN HYDROCHLORIDE 60 MG/1
60 CAPSULE, DELAYED RELEASE ORAL DAILY
Qty: 90 CAPSULE | Refills: 0 | Status: SHIPPED | OUTPATIENT
Start: 2023-04-27

## 2023-04-27 RX ORDER — MIRTAZAPINE 30 MG/1
30 TABLET, FILM COATED ORAL NIGHTLY
Qty: 90 TABLET | Refills: 1 | Status: SHIPPED | OUTPATIENT
Start: 2023-04-27

## 2023-04-27 NOTE — PROGRESS NOTES
Preoperative Consultation    Brianda Seth [address] [facility]  Sarkis Clifton [address] [address]  [phone] office  [phone] fax      Scott Márquez  YOB: 1990    Date of Service:  4/27/2023    Vitals:    04/27/23 1054   BP: 110/72   Site: Right Upper Arm   Position: Sitting   Cuff Size: Medium Adult   Pulse: 75   SpO2: 99%   Weight: 112 lb (50.8 kg)   Height: 5' 4\" (1.626 m)      Wt Readings from Last 2 Encounters:   04/27/23 112 lb (50.8 kg)   04/10/23 117 lb (53.1 kg)     BP Readings from Last 3 Encounters:   04/27/23 110/72   04/10/23 117/80   03/29/23 (!) 149/107        Chief Complaint   Patient presents with    Pre-op Exam     Left hand surgery/ carpal tunnel - Dr. Billie Fried     No Known Allergies  Outpatient Medications Marked as Taking for the 4/27/23 encounter (Office Visit) with Lavonne Moritz, APRN - CNP   Medication Sig Dispense Refill    cyclobenzaprine (FLEXERIL) 10 MG tablet Take 1 tablet by mouth 2 times daily as needed for Muscle spasms 180 tablet 3    clopidogrel (PLAVIX) 75 MG tablet Take 1 tablet by mouth daily 30 tablet 3    aspirin EC 81 MG EC tablet Take 1 tablet by mouth daily 90 tablet 1    gabapentin (NEURONTIN) 600 MG tablet Take 1 tablet by mouth 3 times daily for 180 days. 270 tablet 1    DULoxetine (CYMBALTA) 60 MG extended release capsule Take 1 capsule by mouth daily Dose increased 90 capsule 0    mirtazapine (REMERON) 30 MG tablet Take 1 tablet by mouth nightly Dose increased 90 tablet 1       This patient presents to the office today for a preoperative consultation at the request of surgeon, Dr. Carleen Dill, who plans on performing left carpal tunnel release on May 2 and right carpel tunnel release on May 25 at [facility].  The current problem began 4 years ago, and symptoms have been worsening with time. Conservative therapy: N/A.     Planned anesthesia: Local and IV

## 2023-04-27 NOTE — TELEPHONE ENCOUNTER
Auth: NPR  Date: 5/25/2023  Reference # [id number]  Spoke with: IMER VICTOR [phone]  Type of SX: OUTPATIENT  Location: [facility]  CPT: 50230   DX: G56.01  SX area: RT CARPAL  Insurance: [facility]

## 2023-05-01 ENCOUNTER — ANESTHESIA EVENT (OUTPATIENT)
Dept: OPERATING ROOM | Age: 33
End: 2023-05-01
Payer: COMMERCIAL

## 2023-05-02 ENCOUNTER — HOSPITAL ENCOUNTER (OUTPATIENT)
Age: 33
Setting detail: OUTPATIENT SURGERY
Discharge: HOME | End: 2023-05-02
Attending: ORTHOPAEDIC SURGERY | Admitting: ORTHOPAEDIC SURGERY
Payer: COMMERCIAL

## 2023-05-02 ENCOUNTER — ANESTHESIA (OUTPATIENT)
Dept: OPERATING ROOM | Age: 33
End: 2023-05-02
Payer: COMMERCIAL

## 2023-05-02 VITALS
HEIGHT: 64 IN | SYSTOLIC BLOOD PRESSURE: 126 MMHG | WEIGHT: 113 LBS | HEART RATE: 70 BPM | DIASTOLIC BLOOD PRESSURE: 87 MMHG | TEMPERATURE: 97.3 F | BODY MASS INDEX: 19.29 KG/M2 | RESPIRATION RATE: 16 BRPM | OXYGEN SATURATION: 100 %

## 2023-05-02 PROBLEM — G56.02 LEFT CARPAL TUNNEL SYNDROME: Status: ACTIVE | Noted: 2023-05-02

## 2023-05-02 LAB — HCG UR QL: NEGATIVE

## 2023-05-02 PROCEDURE — 3600000013 HC SURGERY LEVEL 3 ADDTL 15MIN: Performed by: ORTHOPAEDIC SURGERY

## 2023-05-02 PROCEDURE — 2580000003 HC RX 258: Performed by: ANESTHESIOLOGY

## 2023-05-02 PROCEDURE — 2500000003 HC RX 250 WO HCPCS: Performed by: NURSE ANESTHETIST, CERTIFIED REGISTERED

## 2023-05-02 PROCEDURE — 7100000011 HC PHASE II RECOVERY - ADDTL 15 MIN: Performed by: ORTHOPAEDIC SURGERY

## 2023-05-02 PROCEDURE — 3700000001 HC ADD 15 MINUTES (ANESTHESIA): Performed by: ORTHOPAEDIC SURGERY

## 2023-05-02 PROCEDURE — 6360000002 HC RX W HCPCS: Performed by: NURSE ANESTHETIST, CERTIFIED REGISTERED

## 2023-05-02 PROCEDURE — 3600000003 HC SURGERY LEVEL 3 BASE: Performed by: ORTHOPAEDIC SURGERY

## 2023-05-02 PROCEDURE — 2709999900 HC NON-CHARGEABLE SUPPLY: Performed by: ORTHOPAEDIC SURGERY

## 2023-05-02 PROCEDURE — 3700000000 HC ANESTHESIA ATTENDED CARE: Performed by: ORTHOPAEDIC SURGERY

## 2023-05-02 PROCEDURE — A4217 STERILE WATER/SALINE, 500 ML: HCPCS | Performed by: ORTHOPAEDIC SURGERY

## 2023-05-02 PROCEDURE — 2580000003 HC RX 258: Performed by: ORTHOPAEDIC SURGERY

## 2023-05-02 PROCEDURE — 84703 CHORIONIC GONADOTROPIN ASSAY: CPT

## 2023-05-02 PROCEDURE — 7100000010 HC PHASE II RECOVERY - FIRST 15 MIN: Performed by: ORTHOPAEDIC SURGERY

## 2023-05-02 PROCEDURE — 2500000003 HC RX 250 WO HCPCS: Performed by: ORTHOPAEDIC SURGERY

## 2023-05-02 RX ORDER — SODIUM CHLORIDE, SODIUM LACTATE, POTASSIUM CHLORIDE, CALCIUM CHLORIDE 600; 310; 30; 20 MG/100ML; MG/100ML; MG/100ML; MG/100ML
INJECTION, SOLUTION INTRAVENOUS CONTINUOUS
Status: DISCONTINUED | OUTPATIENT
Start: 2023-05-02 | End: 2023-05-02 | Stop reason: HOSPADM

## 2023-05-02 RX ORDER — FENTANYL CITRATE 50 UG/ML
INJECTION, SOLUTION INTRAMUSCULAR; INTRAVENOUS PRN
Status: DISCONTINUED | OUTPATIENT
Start: 2023-05-02 | End: 2023-05-02 | Stop reason: SDUPTHER

## 2023-05-02 RX ORDER — SODIUM CHLORIDE 9 MG/ML
INJECTION, SOLUTION INTRAVENOUS PRN
Status: DISCONTINUED | OUTPATIENT
Start: 2023-05-02 | End: 2023-05-02 | Stop reason: HOSPADM

## 2023-05-02 RX ORDER — LIDOCAINE HYDROCHLORIDE 10 MG/ML
1 INJECTION, SOLUTION EPIDURAL; INFILTRATION; INTRACAUDAL; PERINEURAL
Status: DISCONTINUED | OUTPATIENT
Start: 2023-05-02 | End: 2023-05-02 | Stop reason: HOSPADM

## 2023-05-02 RX ORDER — DROPERIDOL 2.5 MG/ML
0.62 INJECTION, SOLUTION INTRAMUSCULAR; INTRAVENOUS
Status: CANCELLED | OUTPATIENT
Start: 2023-05-02 | End: 2023-05-03

## 2023-05-02 RX ORDER — PROPOFOL 10 MG/ML
INJECTION, EMULSION INTRAVENOUS PRN
Status: DISCONTINUED | OUTPATIENT
Start: 2023-05-02 | End: 2023-05-02 | Stop reason: SDUPTHER

## 2023-05-02 RX ORDER — SODIUM CHLORIDE 0.9 % (FLUSH) 0.9 %
5-40 SYRINGE (ML) INJECTION PRN
Status: CANCELLED | OUTPATIENT
Start: 2023-05-02

## 2023-05-02 RX ORDER — SODIUM CHLORIDE 0.9 % (FLUSH) 0.9 %
5-40 SYRINGE (ML) INJECTION PRN
Status: DISCONTINUED | OUTPATIENT
Start: 2023-05-02 | End: 2023-05-02 | Stop reason: HOSPADM

## 2023-05-02 RX ORDER — ONDANSETRON 2 MG/ML
4 INJECTION INTRAMUSCULAR; INTRAVENOUS
Status: CANCELLED | OUTPATIENT
Start: 2023-05-02 | End: 2023-05-03

## 2023-05-02 RX ORDER — LIDOCAINE HYDROCHLORIDE 20 MG/ML
INJECTION, SOLUTION INFILTRATION; PERINEURAL PRN
Status: DISCONTINUED | OUTPATIENT
Start: 2023-05-02 | End: 2023-05-02 | Stop reason: SDUPTHER

## 2023-05-02 RX ORDER — KETOROLAC TROMETHAMINE 30 MG/ML
15 INJECTION, SOLUTION INTRAMUSCULAR; INTRAVENOUS
Status: CANCELLED | OUTPATIENT
Start: 2023-05-02 | End: 2023-05-03

## 2023-05-02 RX ORDER — ACETAMINOPHEN 500 MG
1000 TABLET ORAL
Status: CANCELLED | OUTPATIENT
Start: 2023-05-02 | End: 2023-05-03

## 2023-05-02 RX ORDER — MAGNESIUM HYDROXIDE 1200 MG/15ML
LIQUID ORAL CONTINUOUS PRN
Status: DISCONTINUED | OUTPATIENT
Start: 2023-05-02 | End: 2023-05-02 | Stop reason: HOSPADM

## 2023-05-02 RX ORDER — SODIUM CHLORIDE 0.9 % (FLUSH) 0.9 %
5-40 SYRINGE (ML) INJECTION EVERY 12 HOURS SCHEDULED
Status: DISCONTINUED | OUTPATIENT
Start: 2023-05-02 | End: 2023-05-02 | Stop reason: HOSPADM

## 2023-05-02 RX ORDER — MIDAZOLAM HYDROCHLORIDE 1 MG/ML
INJECTION INTRAMUSCULAR; INTRAVENOUS PRN
Status: DISCONTINUED | OUTPATIENT
Start: 2023-05-02 | End: 2023-05-02 | Stop reason: SDUPTHER

## 2023-05-02 RX ORDER — SODIUM CHLORIDE 9 MG/ML
INJECTION, SOLUTION INTRAVENOUS PRN
Status: CANCELLED | OUTPATIENT
Start: 2023-05-02

## 2023-05-02 RX ORDER — SODIUM CHLORIDE 0.9 % (FLUSH) 0.9 %
5-40 SYRINGE (ML) INJECTION EVERY 12 HOURS SCHEDULED
Status: CANCELLED | OUTPATIENT
Start: 2023-05-02

## 2023-05-02 RX ADMIN — SODIUM CHLORIDE, POTASSIUM CHLORIDE, SODIUM LACTATE AND CALCIUM CHLORIDE: 600; 310; 30; 20 INJECTION, SOLUTION INTRAVENOUS at 07:43

## 2023-05-02 RX ADMIN — LIDOCAINE HYDROCHLORIDE 60 MG: 20 INJECTION, SOLUTION INFILTRATION; PERINEURAL at 08:45

## 2023-05-02 RX ADMIN — FENTANYL CITRATE 50 MCG: 50 INJECTION, SOLUTION INTRAMUSCULAR; INTRAVENOUS at 08:43

## 2023-05-02 RX ADMIN — MIDAZOLAM HYDROCHLORIDE 1 MG: 2 INJECTION, SOLUTION INTRAMUSCULAR; INTRAVENOUS at 08:43

## 2023-05-02 RX ADMIN — PROPOFOL 220 MG: 10 INJECTION, EMULSION INTRAVENOUS at 08:45

## 2023-05-02 ASSESSMENT — PAIN SCALES - GENERAL: PAINLEVEL_OUTOF10: 0

## 2023-05-02 ASSESSMENT — PAIN - FUNCTIONAL ASSESSMENT: PAIN_FUNCTIONAL_ASSESSMENT: 0-10

## 2023-05-02 ASSESSMENT — ENCOUNTER SYMPTOMS: SHORTNESS OF BREATH: 0

## 2023-05-02 NOTE — H&P
Pre-operative Update of H&P:    I  have seen & examined Ms. Jeanine Nelson related solely to her hand and upper extremity conditions, prior to the scheduled procedure on the date of her surgery. The indications for the planned surgical procedure & and her upper-extremity condition are unchanged.

## 2023-05-02 NOTE — OP NOTE
similarly incised under direct visualization. The contents of the carpal tunnel were inspected and found to be free of mass, lesion or other abnormality. Digital palpation revealed no further constriction about the median nerve. The wound was irrigated copiously with sterile saline for irrigation and the pneumotourniquet was deflated after a period of 2 minutes elevation. The fingers were immediately pink & well perfused. Hemostasis was easily obtained with direct pressure and electrocautery and the wound was closed with interrupted sutures. The wound was dressed with adaptic, dry sterile dressings and a bulky soft hand & wrist dressing was applied. Ms. Guerda Mcmullen  was awakened from light sedation, having tolerated the procedure without apparent complication. She  was returned to the recovery room in stable condition. At the conclusion of the procedure all needle, instrument, and sponge counts were correct. Yenifer Gardner MD   5/2/2023, 8:46 AM Niacinamide Pregnancy And Lactation Text: These medications are considered safe during pregnancy.

## 2023-05-02 NOTE — ANESTHESIA PRE PROCEDURE
Department of Anesthesiology  Preprocedure Note       Name:  Tomeka Bustos   Age:  26 y.o.  :  1990                                          MRN:  [id number]         Date:  2023      Surgeon: Gee Bardales):  Fito Osman MD    Procedure: Procedure(s):  LEFT CARPAL TUNNEL RELEASE    Medications prior to admission:   Prior to Admission medications    Medication Sig Start Date End Date Taking? Authorizing Provider   DULoxetine (CYMBALTA) 60 MG extended release capsule Take 1 capsule by mouth daily Dose increased 23   ALIYAH Doll CNP   mirtazapine (REMERON) 30 MG tablet Take 1 tablet by mouth nightly Dose increased 23   ALIYAH Doll CNP   cyclobenzaprine (FLEXERIL) 10 MG tablet Take 1 tablet by mouth 2 times daily as needed for Muscle spasms 4/10/23   Amado Hua DO   clopidogrel (PLAVIX) 75 MG tablet Take 1 tablet by mouth daily 3/29/23   Tess Gray MD   aspirin EC 81 MG EC tablet Take 1 tablet by mouth daily 3/29/23   Tess Gray MD   gabapentin (NEURONTIN) 600 MG tablet Take 1 tablet by mouth 3 times daily for 180 days.  23  Modesto Guzman DO       Current medications:    Current Facility-Administered Medications   Medication Dose Route Frequency Provider Last Rate Last Admin    lidocaine PF 1 % injection 1 mL  1 mL IntraDERmal Once PRN Jobie Opitz, MD        lactated ringers IV soln infusion   IntraVENous Continuous Jobie Opitz,  mL/hr at 23 0743 New Bag at 23 0743    sodium chloride flush 0.9 % injection 5-40 mL  5-40 mL IntraVENous 2 times per day Jobie Opitz, MD        sodium chloride flush 0.9 % injection 5-40 mL  5-40 mL IntraVENous PRN Jobie Opitz, MD        0.9 % sodium chloride infusion   IntraVENous PRN Jobie Opitz, MD           Allergies:  No Known Allergies    Problem List:    Patient Active Problem List   Diagnosis Code    Bilateral carpal tunnel

## 2023-05-02 NOTE — PROGRESS NOTES
Discharge instructions reviewed with patient/responsible adult and understanding verbalized. Discharge instructions signed and copies given. Patient discharged home with belongings. Discharge criteria have been met per policy.

## 2023-05-02 NOTE — ANESTHESIA POSTPROCEDURE EVALUATION
Department of Anesthesiology  Postprocedure Note    Patient: Shari Messina  MRN: [id number]  YOB: 1990  Date of evaluation: 5/2/2023      Procedure Summary     Date: 05/02/23 Room / Location: [facility] OR 01 / [facility]    Anesthesia Start: 7535 Anesthesia Stop: 9289    Procedure: LEFT CARPAL TUNNEL RELEASE (Left: Wrist) Diagnosis:       Left carpal tunnel syndrome      (Left carpal tunnel syndrome [G56.02])    Surgeons: Boo Rivera MD Responsible Provider: Josiane Pereira MD    Anesthesia Type: MAC ASA Status: 2          Anesthesia Type: No value filed.     Annita Phase I: Annita Score: 10    Annita Phase II: Annita Score: 10      Anesthesia Post Evaluation    Patient location during evaluation: PACU  Patient participation: complete - patient participated  Level of consciousness: awake and alert  Airway patency: patent  Nausea & Vomiting: no nausea and no vomiting  Complications: no  Cardiovascular status: hemodynamically stable  Respiratory status: acceptable, room air and spontaneous ventilation  Hydration status: stable  Comments: ---------------------------               05/02/23                      0927         ---------------------------   BP:          126/87         Pulse:         70           Resp:          16           Temp:   97.3 °F (36.3 °C)   SpO2:         100%         ---------------------------

## 2023-05-03 ENCOUNTER — OFFICE VISIT (OUTPATIENT)
Dept: VASCULAR SURGERY | Age: 33
End: 2023-05-03
Payer: COMMERCIAL

## 2023-05-03 VITALS — WEIGHT: 113 LBS | BODY MASS INDEX: 19.29 KG/M2 | HEART RATE: 68 BPM | HEIGHT: 64 IN

## 2023-05-03 DIAGNOSIS — I15.0 RENOVASCULAR HYPERTENSION: Primary | ICD-10-CM

## 2023-05-03 DIAGNOSIS — I70.1 RENAL ARTERY STENOSIS (HCC): ICD-10-CM

## 2023-05-03 PROCEDURE — 99212 OFFICE O/P EST SF 10 MIN: CPT | Performed by: SURGERY

## 2023-05-03 NOTE — PROGRESS NOTES
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 Feels great. By report BP has been well controlled off ALL medications. EXAM  125/85 (performed personally)    No abdominal bruits heard    R groin without hematoma    A/P:  S/P R renal artery angioplasty for FMD and renovascular HTN   Excellent response   Observe clinically with renal duplex scan in 6 months and OV. Pt moving to [place] but will f/u here in future.

## 2023-05-08 ENCOUNTER — OFFICE VISIT (OUTPATIENT)
Dept: ORTHOPEDIC SURGERY | Age: 33
End: 2023-05-08

## 2023-05-08 VITALS — HEIGHT: 64 IN | WEIGHT: 113 LBS | BODY MASS INDEX: 19.29 KG/M2 | RESPIRATION RATE: 16 BRPM

## 2023-05-08 DIAGNOSIS — G56.03 BILATERAL CARPAL TUNNEL SYNDROME: Primary | ICD-10-CM

## 2023-05-08 PROCEDURE — 99024 POSTOP FOLLOW-UP VISIT: CPT | Performed by: ORTHOPAEDIC SURGERY

## 2023-05-08 NOTE — PATIENT INSTRUCTIONS
Postoperative Instructions After Carpal Tunnel Release    Dr. Mary Ann Best        After bandages are removed one week from surgery, you may chose to wear a small bandage over the incision if you wish, though you do not need to. Keep incision dry until sutures have fully dissolved  or it has been 12 - 14 days since your surgery. Thereafter, you may wash with mild soap and water and shower normally. Work hard on motion of the fingers and wrist, straightening each finger fully and bending each finger fully, bending wrist forward and bending wrist backwards. Do not be concerned if you experience discomfort. This will not damage the surgery. You may begin using the hand as it feels comfortable beginning 12 - 14 days from the day of surgery. You may not feel entirely comfortable gripping or lifting heavy objects for several weeks. You may expect to see some skin peel off around the incision. You may be left with a small area of pink baby skin. This is quite normal.  6. Once your stiches have fully disappeared & skin appears normal, you should begin gently massaging the incision with Vitamin E (may use Vitamin E lotion or contents of Vitamin E capsule). Thank you for choosing [facility]) Physicians for your Hand and Upper Extremity needs. If we can be of any further assistance to you, please do not hesitate to contact us.     Office Phone Number:  [phone]  or  [phone]

## 2023-05-08 NOTE — PROGRESS NOTES
Ms. Rhett Way returns today in follow-up of her recent left Carpal Tunnel Release done approximately 6 days ago. She has done well noting mild discomfort and no other reported complications. She notes pre-operative symptoms to be completely resolved at this time. Physical Exam:  Bandage changed prior to visit. Skin incision is healing well, without erythema, drainage or sign of infection. Digital range of motion is without significant limitation. Wrist range of motion is without significant limitation. Sensation is improved from preoperatvely  Vascular examination reveals normal, good capillary refill, and good color. Swelling is minimal.  Sensory and Motor Median Nerve function is intact. Impression:  Ms. Rhett Way is doing well after recent left Carpal Tunnel Release. Plan:  Ms. Rhett Way is instructed in work on Active & Passive range of motion of the digits, wrist, & elbow. These modalities were specifically demonstrated to her today. We discussed the appropriateness of gradual resumption of use of the operated hand and the return to normal use as comfort allows. She is given instructions regarding management of the fresh surgical incision and progressive use of desensitization and tissue massage techniques. We discussed the appropriate expectations and timeline for symptom improvement. She is provided a written patient instruction sheet titled: Postoperative Instructions After Carpal Tunnel Release. I have asked Ms. Rhett Way to follow-up with me or contact me by telephone over the next 2-4 weeks if her symptoms have not fully resolved or if she has not regained full & painless return of function. She is also specifically instructed to return to the office or call for an appointment sooner if her symptoms are changing or worsening prior to that time.

## 2023-05-15 DIAGNOSIS — F41.8 DEPRESSION WITH ANXIETY: ICD-10-CM

## 2023-05-15 RX ORDER — DULOXETIN HYDROCHLORIDE 60 MG/1
CAPSULE, DELAYED RELEASE ORAL
Qty: 90 CAPSULE | Refills: 0 | Status: SHIPPED | OUTPATIENT
Start: 2023-05-15

## 2023-05-15 NOTE — TELEPHONE ENCOUNTER
Medication:   Requested Prescriptions     Pending Prescriptions Disp Refills    DULoxetine (CYMBALTA) 60 MG extended release capsule [Pharmacy Med Name: DULoxetine HCL DR 60 MG CAPSULE] 90 capsule 0     Sig: TAKE ONE CAPSULE BY MOUTH DAILY        Last Filled:  04/27/2023    Patient Phone Number: [phone] (home)     Last appt: 4/27/2023   Next appt: Visit date not found    Last OARRS: No flowsheet data found.

## 2023-05-30 ENCOUNTER — TELEPHONE (OUTPATIENT)
Dept: VASCULAR SURGERY | Age: 33
End: 2023-05-30

## 2023-06-02 ENCOUNTER — OFFICE VISIT (OUTPATIENT)
Dept: FAMILY MEDICINE CLINIC | Age: 33
End: 2023-06-02
Payer: COMMERCIAL

## 2023-06-02 VITALS
SYSTOLIC BLOOD PRESSURE: 135 MMHG | BODY MASS INDEX: 19.12 KG/M2 | OXYGEN SATURATION: 99 % | DIASTOLIC BLOOD PRESSURE: 81 MMHG | HEIGHT: 64 IN | HEART RATE: 65 BPM | WEIGHT: 112 LBS

## 2023-06-02 DIAGNOSIS — G56.01 RIGHT CARPAL TUNNEL SYNDROME: ICD-10-CM

## 2023-06-02 DIAGNOSIS — R94.31 ABNORMAL EKG: ICD-10-CM

## 2023-06-02 DIAGNOSIS — Z01.818 PREOP EXAMINATION: Primary | ICD-10-CM

## 2023-06-02 DIAGNOSIS — I77.3 FIBROMUSCULAR DYSPLASIA (HCC): ICD-10-CM

## 2023-06-02 PROCEDURE — 93000 ELECTROCARDIOGRAM COMPLETE: CPT | Performed by: FAMILY MEDICINE

## 2023-06-02 PROCEDURE — 99214 OFFICE O/P EST MOD 30 MIN: CPT | Performed by: FAMILY MEDICINE

## 2023-06-06 ENCOUNTER — HOSPITAL ENCOUNTER (OUTPATIENT)
Age: 33
Setting detail: OUTPATIENT SURGERY
Discharge: HOME | End: 2023-06-06
Attending: ORTHOPAEDIC SURGERY
Payer: COMMERCIAL

## 2023-06-06 ENCOUNTER — TELEPHONE (OUTPATIENT)
Dept: ORTHOPEDIC SURGERY | Age: 33
End: 2023-06-06

## 2023-06-06 VITALS
OXYGEN SATURATION: 100 % | RESPIRATION RATE: 16 BRPM | WEIGHT: 115 LBS | HEART RATE: 92 BPM | HEIGHT: 64 IN | TEMPERATURE: 97.6 F | SYSTOLIC BLOOD PRESSURE: 129 MMHG | BODY MASS INDEX: 19.63 KG/M2 | DIASTOLIC BLOOD PRESSURE: 93 MMHG

## 2023-06-06 LAB — HCG UR QL: NEGATIVE

## 2023-06-06 PROCEDURE — 84703 CHORIONIC GONADOTROPIN ASSAY: CPT

## 2023-06-06 PROCEDURE — 2580000003 HC RX 258: Performed by: ANESTHESIOLOGY

## 2023-06-06 RX ORDER — SODIUM CHLORIDE 0.9 % (FLUSH) 0.9 %
5-40 SYRINGE (ML) INJECTION PRN
Status: ACTIVE | OUTPATIENT
Start: 2023-06-06

## 2023-06-06 RX ORDER — LIDOCAINE HYDROCHLORIDE 10 MG/ML
0.3 INJECTION, SOLUTION EPIDURAL; INFILTRATION; INTRACAUDAL; PERINEURAL
Status: ACTIVE | OUTPATIENT
Start: 2023-06-06 | End: 2023-06-07

## 2023-06-06 RX ORDER — SODIUM CHLORIDE 9 MG/ML
INJECTION, SOLUTION INTRAVENOUS PRN
Status: ACTIVE | OUTPATIENT
Start: 2023-06-06

## 2023-06-06 RX ORDER — SODIUM CHLORIDE 0.9 % (FLUSH) 0.9 %
5-40 SYRINGE (ML) INJECTION EVERY 12 HOURS SCHEDULED
Status: ACTIVE | OUTPATIENT
Start: 2023-06-06

## 2023-06-06 RX ORDER — SODIUM CHLORIDE, SODIUM LACTATE, POTASSIUM CHLORIDE, CALCIUM CHLORIDE 600; 310; 30; 20 MG/100ML; MG/100ML; MG/100ML; MG/100ML
INJECTION, SOLUTION INTRAVENOUS CONTINUOUS
Status: ACTIVE | OUTPATIENT
Start: 2023-06-06

## 2023-06-06 RX ADMIN — SODIUM CHLORIDE, POTASSIUM CHLORIDE, SODIUM LACTATE AND CALCIUM CHLORIDE: 600; 310; 30; 20 INJECTION, SOLUTION INTRAVENOUS at 06:32

## 2023-06-06 ASSESSMENT — PAIN - FUNCTIONAL ASSESSMENT: PAIN_FUNCTIONAL_ASSESSMENT: 0-10

## 2023-06-06 NOTE — PROGRESS NOTES
Dr. Deny Fam informed of pt status - poison ivy on right wrist. Decision to cancel case. Pt informed. Pt very tearful and upset due to having everything planned out for this case today and it is causing a disruption in plans. Pt given office number for Dr. Deny Fam and encouraged to call today to get rescheduled asap. Understanding verbalized. IV removed. Pt left to parking lot where brother was waiting for her.

## 2023-06-06 NOTE — H&P
Pre-operative Update of H&P:    I  have seen & examined Ms. Lucian Barker related solely to her hand and upper extremity conditions, prior to the scheduled procedure on the date of her surgery. SHE HAS PRESENTED WITH ACTIVE POISON IVY REACTION LESS THAN 2 INCHES FROM THE PLANNED SURGICAL INCISION AND IN SEVERAL SITES WHICH WOULD REQUIRE BEING COVERED BY THE POSTOPERATIVE DRESSING FOR 7-10 DAYS. I HAVE EXPLAINED TO Ms. Lucian Barker THAT DUE TO THE ELECTIVE NATURE OF THIS PROCEDURE, THAT THERE WAS NO VALID REASON TO INCUR ADDITIONAL RISK OF COMPLICATIONS RELATED TO HER SKIN CONDITION WHICH WOULD BE EXPECTED TO RESOLVE OVER THE SHORT TERM FUTURE. SHE BECAME VARY UPSET, CRYING, USING PROFANITY AND BECAME AGGRESSIVE TOWARD ME.  I EXPLAINED THAT IT WAS MEDICALLY MOST APPROPRIATE AND ADVISABLE TO CANCEL THE PLANNED ELECTIVE PROCEDURE FOR TODAY.

## 2023-07-17 ENCOUNTER — TELEPHONE (OUTPATIENT)
Dept: ORTHOPEDIC SURGERY | Age: 33
End: 2023-07-17

## 2023-07-26 ENCOUNTER — TELEPHONE (OUTPATIENT)
Dept: FAMILY MEDICINE CLINIC | Age: 33
End: 2023-07-26

## 2023-07-26 DIAGNOSIS — G56.01 RIGHT CARPAL TUNNEL SYNDROME: Primary | ICD-10-CM

## 2023-07-26 NOTE — TELEPHONE ENCOUNTER
Pt called asking for Dr. Esa Brown to giver her a referral for a different hand doctor.  Pt is reachable at [phone]

## 2023-07-27 NOTE — TELEPHONE ENCOUNTER
Looks like patient saw Dr. Justin Hilton in the past. Was she not happy with him? He is the only surgeon in [place]. I placed an external referral if she would like to go to [address] [place] [address] or . I do not have any specific recommendations. Please document call and then close encounter.   thanks

## 2023-07-28 NOTE — TELEPHONE ENCOUNTER
Called and informed pt regarding below.  Pt states she didn't like  and wouldn't mind an external referral.

## 2023-07-28 NOTE — TELEPHONE ENCOUNTER
Referral was already placed. Can print and leave at . Please document call and then close encounter.   thanks

## 2023-08-04 RX ORDER — CLOPIDOGREL BISULFATE 75 MG/1
TABLET ORAL
Qty: 30 TABLET | Refills: 3 | OUTPATIENT
Start: 2023-08-04

## 2023-08-15 ENCOUNTER — TELEPHONE (OUTPATIENT)
Dept: ORTHOPEDIC SURGERY | Age: 33
End: 2023-08-15

## 2023-08-15 NOTE — TELEPHONE ENCOUNTER
Faxing Patient [address] Name: Rohan Streeter   Contact Name: DR. Fran Lerma Number:   Facility Fax Number: [phone] [facility] EMG TEST ON HER RT HAND SENT OVER TO THE FACILITY ABOVE. PT [facility] #: [phone]    PLEASE CALL BACK PT AT THE ABOVE NUMBER LISTED ONCE EMG HAS BEEN FAXED.

## 2023-08-20 DIAGNOSIS — G89.29 OTHER CHRONIC PAIN: Primary | ICD-10-CM

## 2023-08-21 RX ORDER — CYCLOBENZAPRINE HCL 10 MG
10 TABLET ORAL 2 TIMES DAILY PRN
Qty: 180 TABLET | Refills: 1 | Status: SHIPPED | OUTPATIENT
Start: 2023-08-21

## 2023-08-21 RX ORDER — GABAPENTIN 600 MG/1
600 TABLET ORAL 3 TIMES DAILY
Qty: 270 TABLET | Refills: 1 | Status: SHIPPED | OUTPATIENT
Start: 2023-08-21 | End: 2024-02-17

## 2023-08-21 NOTE — TELEPHONE ENCOUNTER
Medication:   Requested Prescriptions     Pending Prescriptions Disp Refills    gabapentin (NEURONTIN) 600 MG tablet 270 tablet 1     Sig: Take 1 tablet by mouth 3 times daily for 180 days. cyclobenzaprine (FLEXERIL) 10 MG tablet 180 tablet 3     Sig: Take 1 tablet by mouth 2 times daily as needed for Muscle spasms        Last Filled:  Feb and April 2023    Patient Phone Number: [phone] (home)     Last appt: 6/2/2023   Next appt: Visit date not found    Last OARRS: No flowsheet data found.

## 2023-09-29 RX ORDER — ASPIRIN 81 MG/1
81 TABLET, COATED ORAL DAILY
Qty: 90 TABLET | Refills: 1 | Status: SHIPPED | OUTPATIENT
Start: 2023-09-29

## 2023-10-30 DIAGNOSIS — F41.8 DEPRESSION WITH ANXIETY: ICD-10-CM

## 2023-10-31 RX ORDER — MIRTAZAPINE 30 MG/1
30 TABLET, FILM COATED ORAL NIGHTLY
Qty: 90 TABLET | Refills: 1 | Status: SHIPPED | OUTPATIENT
Start: 2023-10-31

## 2023-10-31 RX ORDER — DULOXETIN HYDROCHLORIDE 60 MG/1
CAPSULE, DELAYED RELEASE ORAL
Qty: 90 CAPSULE | Refills: 0 | Status: SHIPPED | OUTPATIENT
Start: 2023-10-31

## 2023-10-31 NOTE — TELEPHONE ENCOUNTER
Medication:   Requested Prescriptions     Pending Prescriptions Disp Refills    mirtazapine (REMERON) 30 MG tablet 90 tablet 1     Sig: Take 1 tablet by mouth nightly Dose increased        Last Filled:  04/27/2023    Patient Phone Number: [phone] (home)     Last appt: 6/2/2023   Next appt: Visit date not found    Last OARRS:        No data to display

## 2023-10-31 NOTE — TELEPHONE ENCOUNTER
Medication:   Requested Prescriptions     Pending Prescriptions Disp Refills    DULoxetine (CYMBALTA) 60 MG extended release capsule [Pharmacy Med Name: DULoxetine HCL DR 60 MG CAPSULE] 90 capsule 0     Sig: TAKE ONE CAPSULE BY MOUTH DAILY        Last Filled:  05/15/2023    Patient Phone Number: [phone] (home)     Last appt: 6/2/2023   Next appt: 10/30/2023    Last OARRS:        No data to display

## 2023-11-07 DIAGNOSIS — I70.1 RENAL ARTERY STENOSIS (HCC): Primary | ICD-10-CM

## 2023-11-08 ENCOUNTER — OFFICE VISIT (OUTPATIENT)
Dept: VASCULAR SURGERY | Age: 33
End: 2023-11-08
Payer: COMMERCIAL

## 2023-11-08 ENCOUNTER — PROCEDURE VISIT (OUTPATIENT)
Dept: SURGERY | Age: 33
End: 2023-11-08
Payer: COMMERCIAL

## 2023-11-08 VITALS
SYSTOLIC BLOOD PRESSURE: 94 MMHG | DIASTOLIC BLOOD PRESSURE: 64 MMHG | HEIGHT: 64 IN | BODY MASS INDEX: 19.63 KG/M2 | WEIGHT: 115 LBS | HEART RATE: 90 BPM

## 2023-11-08 DIAGNOSIS — I15.0 RENOVASCULAR HYPERTENSION: Primary | ICD-10-CM

## 2023-11-08 DIAGNOSIS — I70.1 RENAL ARTERY STENOSIS (HCC): ICD-10-CM

## 2023-11-08 PROCEDURE — 93975 VASCULAR STUDY: CPT | Performed by: SURGERY

## 2023-11-08 PROCEDURE — 99212 OFFICE O/P EST SF 10 MIN: CPT | Performed by: SURGERY

## 2023-11-08 NOTE — PROGRESS NOTES
Pt RTO S/P R renal artery angioplasty 3/28/2023 for FMD and renovascular HTN. Feels great. No reported HTN. No oral HTN meds. EXAM:      Abd soft with nl BS; no bruits     Renal artery duplex today  R renal normal proximal with distal 250 cm/sec       L renal artery normal    A/P: S/P R renal artery angioplasty for HTN and FMD.   May have mild clinically insignificant restenosis. Observe. Further evaluation if HTN recurs. F/U with scan in 12 months or earlier. Pt understands and is pleased.

## 2024-01-30 DIAGNOSIS — F41.8 DEPRESSION WITH ANXIETY: ICD-10-CM

## 2024-01-30 RX ORDER — DULOXETIN HYDROCHLORIDE 60 MG/1
60 CAPSULE, DELAYED RELEASE ORAL DAILY
Qty: 90 CAPSULE | Refills: 3 | Status: SHIPPED | OUTPATIENT
Start: 2024-01-30

## 2024-01-30 NOTE — TELEPHONE ENCOUNTER
Medication:   Requested Prescriptions     Pending Prescriptions Disp Refills    DULoxetine (CYMBALTA) 60 MG extended release capsule 90 capsule 3     Sig: Take 1 capsule by mouth daily        Last Filled:  10/31/23    Patient Phone Number: [phone] (home)     Last appt: 6/2/2023   Next appt: Visit date not found    Last OARRS:        No data to display

## 2024-02-16 NOTE — TELEPHONE ENCOUNTER
Medication:   Requested Prescriptions     Pending Prescriptions Disp Refills    cyclobenzaprine (FLEXERIL) 10 MG tablet [Pharmacy Med Name: CYCLOBENZAPRINE 10 MG TABLET] 180 tablet 1     Sig: TAKE 1 TABLET BY MOUTH TWICE A DAY AS NEEDED FOR MUSCLE SPASMS        Last Filled:  11/19/23    Patient Phone Number: [phone] (home)     Last appt: 6/2/2023   Next appt: Visit date not found    Last OARRS:        No data to display

## 2024-02-18 DIAGNOSIS — G89.29 OTHER CHRONIC PAIN: ICD-10-CM

## 2024-02-19 RX ORDER — CYCLOBENZAPRINE HCL 10 MG
TABLET ORAL
Qty: 180 TABLET | Refills: 1 | Status: SHIPPED | OUTPATIENT
Start: 2024-02-19

## 2024-02-19 RX ORDER — GABAPENTIN 600 MG/1
600 TABLET ORAL 3 TIMES DAILY
Qty: 90 TABLET | Refills: 0 | Status: SHIPPED | OUTPATIENT
Start: 2024-02-19 | End: 2024-03-20

## 2024-02-19 NOTE — TELEPHONE ENCOUNTER
Medication:   Requested Prescriptions     Pending Prescriptions Disp Refills    gabapentin (NEURONTIN) 600 MG tablet [Pharmacy Med Name: GABAPENTIN 600 MG TABLET] 270 tablet 0     Sig: Take 1 tablet by mouth 3 times daily for 30 days. Max Daily Amount: 1,800 mg        Last Filled:  11/20/23    Patient Phone Number: [phone] (home)     Last appt: 6/2/2023   Next appt: Visit date not found    Last OARRS:        No data to display

## 2024-04-29 DIAGNOSIS — F41.8 DEPRESSION WITH ANXIETY: ICD-10-CM

## 2024-04-29 RX ORDER — MIRTAZAPINE 30 MG/1
30 TABLET, FILM COATED ORAL NIGHTLY
Qty: 30 TABLET | Refills: 0 | Status: SHIPPED | OUTPATIENT
Start: 2024-04-29

## 2024-04-29 NOTE — TELEPHONE ENCOUNTER
Medication:   Requested Prescriptions     Pending Prescriptions Disp Refills    mirtazapine (REMERON) 30 MG tablet [Pharmacy Med Name: MIRTAZAPINE 30 MG TABLET] 90 tablet 1     Sig: TAKE ONE TABLET BY MOUTH ONCE NIGHTLY       Last Filled:  10/31/2023    Patient Phone Number: [phone] (home)     Last appt: 6/2/2023   Next appt: Visit date not found

## 2024-06-05 DIAGNOSIS — F41.8 DEPRESSION WITH ANXIETY: ICD-10-CM

## 2024-06-05 NOTE — TELEPHONE ENCOUNTER
Medication:   Requested Prescriptions     Pending Prescriptions Disp Refills    mirtazapine (REMERON) 30 MG tablet [Pharmacy Med Name: MIRTAZAPINE 30 MG TABLET] 90 tablet 1     Sig: TAKE 1 TABLET BY MOUTH NIGHTLY        Last Filled:  04/29/2024      Patient Phone Number: [phone] (home)     Last appt: 6/2/2023   Next appt: Visit date not found    Last OARRS:        No data to display

## 2024-06-06 RX ORDER — MIRTAZAPINE 30 MG/1
30 TABLET, FILM COATED ORAL NIGHTLY
Qty: 90 TABLET | Refills: 1 | Status: SHIPPED | OUTPATIENT
Start: 2024-06-06

## (undated) DEVICE — UNDERGLOVE SURG SZ 8 FNGR THK0.21MIL GRN LTX BEAD CUF

## (undated) DEVICE — SYRINGE MED 10ML LUERLOCK TIP W/O SFTY DISP

## (undated) DEVICE — CORD ES L12FT BPLR FRCP

## (undated) DEVICE — SOLUTION IV IRRIG 500ML 0.9% SODIUM CHL 2F7123

## (undated) DEVICE — PADDING CAST W4INXL4YD NONSTERILE COT RAYON MICROPLEATED

## (undated) DEVICE — WILLIS PACK: Brand: MEDLINE INDUSTRIES, INC.

## (undated) DEVICE — NEEDLE HYPO 25GA L1.5IN BLU POLYPR HUB S STL REG BVL STR

## (undated) DEVICE — WRAP COHESIVE W2INXL5YD TAN SELF ADH BNDG HND NON STERILE TEAR CARING

## (undated) DEVICE — NEEDLE HYPO 18GA L1.5IN THN WALL PIVOTING SHLD BVL ORIENTED

## (undated) DEVICE — GLOVE ORANGE PI 7   MSG9070

## (undated) DEVICE — GOWN,SIRUS,NON REINFRCD,LARGE,SET IN SL: Brand: MEDLINE

## (undated) DEVICE — GLOVE SURG SZ 65 L12IN FNGR THK94MIL STD WHT LTX FREE

## (undated) DEVICE — BNDG,ELSTC,MATRIX,STRL,4"X5YD,LF,HOOK&LP: Brand: MEDLINE